# Patient Record
Sex: FEMALE | Race: WHITE | NOT HISPANIC OR LATINO | Employment: FULL TIME | URBAN - METROPOLITAN AREA
[De-identification: names, ages, dates, MRNs, and addresses within clinical notes are randomized per-mention and may not be internally consistent; named-entity substitution may affect disease eponyms.]

---

## 2023-04-07 ENCOUNTER — OFFICE VISIT (OUTPATIENT)
Dept: PRIMARY CARE | Facility: CLINIC | Age: 43
End: 2023-04-07
Payer: COMMERCIAL

## 2023-04-07 VITALS
WEIGHT: 191 LBS | DIASTOLIC BLOOD PRESSURE: 70 MMHG | SYSTOLIC BLOOD PRESSURE: 118 MMHG | BODY MASS INDEX: 28.95 KG/M2 | HEIGHT: 68 IN | HEART RATE: 83 BPM

## 2023-04-07 DIAGNOSIS — M25.572 ACUTE LEFT ANKLE PAIN: Primary | ICD-10-CM

## 2023-04-07 DIAGNOSIS — M25.472 LEFT ANKLE SWELLING: ICD-10-CM

## 2023-04-07 DIAGNOSIS — S93.402D INVERSION SPRAIN OF ANKLE, LEFT, SUBSEQUENT ENCOUNTER: ICD-10-CM

## 2023-04-07 DIAGNOSIS — B35.4 RINGWORM OF BODY: ICD-10-CM

## 2023-04-07 PROCEDURE — 99214 OFFICE O/P EST MOD 30 MIN: CPT | Performed by: STUDENT IN AN ORGANIZED HEALTH CARE EDUCATION/TRAINING PROGRAM

## 2023-04-07 RX ORDER — LORATADINE PSEUDOEPHEDRINE SULFATE 10; 240 MG/1; MG/1
TABLET, EXTENDED RELEASE ORAL EVERY 24 HOURS
COMMUNITY
End: 2024-01-02 | Stop reason: SDUPTHER

## 2023-04-07 RX ORDER — AMOXICILLIN AND CLAVULANATE POTASSIUM 875; 125 MG/1; MG/1
875 TABLET, FILM COATED ORAL
COMMUNITY
Start: 2023-04-05 | End: 2024-01-02 | Stop reason: ALTCHOICE

## 2023-04-07 RX ORDER — CLOTRIMAZOLE AND BETAMETHASONE DIPROPIONATE 10; .64 MG/G; MG/G
1 CREAM TOPICAL 2 TIMES DAILY
Qty: 15 G | Refills: 0 | Status: SHIPPED | OUTPATIENT
Start: 2023-04-07 | End: 2024-06-03 | Stop reason: ALTCHOICE

## 2023-04-07 RX ORDER — FLUTICASONE PROPIONATE 50 UG/1
SPRAY, METERED NASAL EVERY 24 HOURS
COMMUNITY
Start: 2016-10-31 | End: 2024-01-02 | Stop reason: SDUPTHER

## 2023-04-07 RX ORDER — ALBUTEROL SULFATE 90 UG/1
2 AEROSOL, METERED RESPIRATORY (INHALATION) EVERY 4 HOURS PRN
COMMUNITY
Start: 2016-04-19 | End: 2024-01-02 | Stop reason: SDUPTHER

## 2023-04-07 ASSESSMENT — PATIENT HEALTH QUESTIONNAIRE - PHQ9
2. FEELING DOWN, DEPRESSED OR HOPELESS: NOT AT ALL
1. LITTLE INTEREST OR PLEASURE IN DOING THINGS: NOT AT ALL
SUM OF ALL RESPONSES TO PHQ9 QUESTIONS 1 AND 2: 0

## 2023-04-07 NOTE — PROGRESS NOTES
Subjective   Patient ID: Claudia Gregory is a 42 y.o. female who presents for Leg Swelling and Possible Ring Worm.  Today she is accompanied by alone.     HPI  1. Acute left ankle pain/Left leg swelling  Patient reports that she rolled her ankle around 7 weeks ago  There has been swelling throughout this whole time  She stated that her leg has been hot and a tingling sensation is present  Ankle pain is still present, but is much better than before  She was wearing a walking boot for 3 weeks, but has since been in her normal footwear    2. Ringworm  Patient reports a 1 week history of a rash that is on her lower left leg  It is pruritic and flaking  She has been using a massage rolling tool at her physical therapy office and thinks that it may be associated with that  Another physician had called her in amoxacillin 875-125 mg, which was started this past Wednesday        Current Outpatient Medications on File Prior to Visit   Medication Sig Dispense Refill    albuterol 90 mcg/actuation inhaler Inhale 2 puffs every 4 hours if needed for shortness of breath.      amoxicillin-pot clavulanate (Augmentin) 875-125 mg tablet Take 1 tablet (875 mg) by mouth.      Flonase Allergy Relief 50 mcg/actuation nasal spray once every 24 hours.      loratadine-pseudoephedrine (Claritin-D 24 Hour)  mg 24 hr tablet once every 24 hours.       No current facility-administered medications on file prior to visit.        Allergies   Allergen Reactions    Adhesive Tape-Silicones Other    Keflex [Cephalexin] Other       Immunization History   Administered Date(s) Administered    Moderna SARS-CoV-2 Vaccination 02/17/2021, 03/17/2021, 12/13/2021         Review of Systems  All pertinent positive symptoms are included in the history of present illness.  All other systems have been reviewed and are negative and noncontributory to this patient's current ailments.     Objective   /70 (BP Location: Left arm, Patient Position: Sitting, BP Cuff  "Size: Adult)   Pulse 83   Ht 1.727 m (5' 8\")   Wt 86.6 kg (191 lb)   BMI 29.04 kg/m²   BSA: 2.04 meters squared  No visits with results within 1 Month(s) from this visit.   Latest known visit with results is:   [facility] Encounter on 12/20/2022   Component Date Value Ref Range Status    TSH 12/20/2022 0.63  0.44 - 3.98 mIU/L Final    Comment:  TSH testing is performed using different testing    methodology at [facility] than at other    [facility]. Direct result comparisons should    only be made within the same method.         Physical Exam  CONSTITUTIONAL - well nourished, well developed, looks like stated age, in no acute distress, not ill-appearing, and not tired appearing  SKIN - normal skin color and pigmentation, normal skin turgor without rash, lesions, or nodules visualized, 1 cm well demarcated red lesion that slightly crusting in the center on left lower posterior extremity  HEAD - no trauma, normocephalic  EYES - extraocular muscles are intact, and normal external exam  CHEST - clear to auscultation, no wheezing, no crackles and no rales, good effort  CARDIAC - regular rate and regular rhythm, no skipped beats, no murmur  EXTREMITIES - edema noted on the lateral aspect of the left ankle, no deformities noted, minor tenderness palpation to this lateral aspect, range of motion within normal limits  NEUROLOGICAL - antalgic gait, normal balance, normal motor, no ataxia,; alert, oriented and no focal signs  PSYCHIATRIC - alert, pleasant and cordial, age-appropriate  IMMUNOLOGIC - no cervical lymphadenopathy     Assessment/Plan   1. Acute left ankle pain/Left leg swelling  I have provided you a requisition for an MRI of your left ankle.  We will notify you of the test results once available and make treatment recommendations accordingly.    2. Ringworm  I have sent a prescription for Lotrisone cream for ringworm.   Apply the cream over the affected area once in the morning and at " night.  Please call back if the rash spreads or worsens.

## 2023-04-25 ENCOUNTER — TELEPHONE (OUTPATIENT)
Dept: PRIMARY CARE | Facility: CLINIC | Age: 43
End: 2023-04-25
Payer: COMMERCIAL

## 2023-04-25 DIAGNOSIS — R21 RASH: Primary | ICD-10-CM

## 2023-04-25 RX ORDER — TRIAMCINOLONE ACETONIDE 1 MG/G
OINTMENT TOPICAL 2 TIMES DAILY PRN
Qty: 15 G | Refills: 0 | Status: SHIPPED | OUTPATIENT
Start: 2023-04-25 | End: 2023-08-23

## 2023-04-25 NOTE — TELEPHONE ENCOUNTER
Pt called in stating that the cream we prescribed for ring worm is not helping, the area is exactly the same no improvement.

## 2023-06-15 ENCOUNTER — TELEPHONE (OUTPATIENT)
Dept: PRIMARY CARE | Facility: CLINIC | Age: 43
End: 2023-06-15
Payer: COMMERCIAL

## 2023-06-15 NOTE — TELEPHONE ENCOUNTER
Pt called to update us on what was originally thought to be ringworm actually is skin cancer. Dermatology attempted to freeze it off which then caused an infection. She was given doxycyline but still had redness, was then given 1 week of Bactrim which she now knows she is allergic too. They attempted to do a culture on the wound area but test came back invalid due to faulty supplies. Pt has follow up on Friday with dermatology hoping they will do another culture of the area. She states the wound itself looks to be pretty much healed but still has prominent redness surrounding the area. She wonders if she needs a referral for wound care clinic or to continue following up with dermatology?

## 2023-06-15 NOTE — TELEPHONE ENCOUNTER
----- Message from Lizy Bautista DO sent at 6/15/2023  1:10 PM EDT -----  This is very shocking to hear and I am so sorry that she is going through all of this    I would recommend that she follows up with dermatology at this point but at any time if dermatology is not fully helping her, we can easily place a referral to the wound care clinic that is located in [place]    Please let me know on what else I can do  ----- Message -----  From: Deepika Lin MA  Sent: 6/15/2023  10:13 AM EDT  To: Lizy Bautista DO

## 2024-01-02 ENCOUNTER — OFFICE VISIT (OUTPATIENT)
Dept: PRIMARY CARE | Facility: CLINIC | Age: 44
End: 2024-01-02
Payer: COMMERCIAL

## 2024-01-02 VITALS
BODY MASS INDEX: 26.98 KG/M2 | WEIGHT: 178 LBS | SYSTOLIC BLOOD PRESSURE: 120 MMHG | DIASTOLIC BLOOD PRESSURE: 80 MMHG | HEIGHT: 68 IN | HEART RATE: 97 BPM

## 2024-01-02 DIAGNOSIS — J30.2 SEASONAL ALLERGIES: ICD-10-CM

## 2024-01-02 DIAGNOSIS — F17.200 TOBACCO DEPENDENCE: ICD-10-CM

## 2024-01-02 DIAGNOSIS — R73.03 PREDIABETES: ICD-10-CM

## 2024-01-02 DIAGNOSIS — Z00.00 HEALTHCARE MAINTENANCE: Primary | ICD-10-CM

## 2024-01-02 DIAGNOSIS — E55.9 VITAMIN D DEFICIENCY: ICD-10-CM

## 2024-01-02 DIAGNOSIS — J31.0 CHRONIC RHINITIS: ICD-10-CM

## 2024-01-02 PROCEDURE — 93000 ELECTROCARDIOGRAM COMPLETE: CPT | Performed by: STUDENT IN AN ORGANIZED HEALTH CARE EDUCATION/TRAINING PROGRAM

## 2024-01-02 PROCEDURE — 99396 PREV VISIT EST AGE 40-64: CPT | Performed by: STUDENT IN AN ORGANIZED HEALTH CARE EDUCATION/TRAINING PROGRAM

## 2024-01-02 RX ORDER — ERGOCALCIFEROL 1.25 MG/1
50000 CAPSULE ORAL
Qty: 13 CAPSULE | Refills: 3 | Status: SHIPPED | OUTPATIENT
Start: 2024-01-02

## 2024-01-02 RX ORDER — LORATADINE 10 MG/1
10 TABLET ORAL DAILY
Qty: 30 TABLET | Refills: 5 | Status: SHIPPED | OUTPATIENT
Start: 2024-01-02 | End: 2024-06-30

## 2024-01-02 RX ORDER — FLUTICASONE PROPIONATE 50 MCG
1 SPRAY, SUSPENSION (ML) NASAL DAILY
Qty: 16 G | Refills: 3 | Status: SHIPPED | OUTPATIENT
Start: 2024-01-02

## 2024-01-02 RX ORDER — ALBUTEROL SULFATE 90 UG/1
2 AEROSOL, METERED RESPIRATORY (INHALATION) EVERY 4 HOURS PRN
Qty: 18 G | Refills: 1 | Status: SHIPPED | OUTPATIENT
Start: 2024-01-02

## 2024-01-02 RX ORDER — LORATADINE PSEUDOEPHEDRINE SULFATE 10; 240 MG/1; MG/1
1 TABLET, EXTENDED RELEASE ORAL EVERY 24 HOURS
Qty: 30 TABLET | Refills: 11 | Status: SHIPPED | OUTPATIENT
Start: 2024-01-02

## 2024-01-02 ASSESSMENT — PATIENT HEALTH QUESTIONNAIRE - PHQ9
2. FEELING DOWN, DEPRESSED OR HOPELESS: NOT AT ALL
SUM OF ALL RESPONSES TO PHQ9 QUESTIONS 1 AND 2: 0
1. LITTLE INTEREST OR PLEASURE IN DOING THINGS: NOT AT ALL

## 2024-01-02 NOTE — PROGRESS NOTES
Subjective   Patient ID: Claudia Gregory is a 43 y.o. female who presents for Annual Exam.  Today she is accompanied by alone.     HPI  1. Health Maintenance  Overall patient is doing well.   Immunization: Tdap 2020  Influenza vaccine declined  COVID-19 vaccine Moderna x 3  Colon Cancer Screening: No family history  OB/GYN: Sees Dr. Thomas; Pap smear and mammogram are up to date. Mammogram revealed a benign fibroadenoma and it is recommended she follow up for a repeat in 6 months.  Diet: Well balanced for the most part  Exercise: [employer] and gets 15,000 steps daily  Tobacco: 1 pack per day for 20 years  EtOH: Denies use    2. Chronic rhinitis / Seasonal allergies  Currently takes Claritin-D and Flonase daily  Uses albuterol inhaler as needed for seasonal changes  Requesting refills.    3. Tobacco dependence  Has smoked a pack per day for 20 years.  No interest in quitting at this time.    4. Prediabetes  Most recent A1C in October was 6%.    5.  Vitamin D deficiency  Has a history of vitamin D deficiency and now wondering if she can take once a day  Also willing to get her vitamin D checked      Current Outpatient Medications on File Prior to Visit   Medication Sig Dispense Refill    clotrimazole-betamethasone (Lotrisone) cream Apply 1 Application topically in the morning and 1 Application before bedtime. 15 g 0    [DISCONTINUED] albuterol 90 mcg/actuation inhaler Inhale 2 puffs every 4 hours if needed for shortness of breath.      [DISCONTINUED] amoxicillin-pot clavulanate (Augmentin) 875-125 mg tablet Take 1 tablet (875 mg) by mouth.      [DISCONTINUED] Flonase Allergy Relief 50 mcg/actuation nasal spray once every 24 hours.      [DISCONTINUED] loratadine-pseudoephedrine (Claritin-D 24 Hour)  mg 24 hr tablet once every 24 hours.       No current facility-administered medications on file prior to visit.        Allergies   Allergen Reactions    Adhesive Tape-Silicones Other    Bactrim  "[Sulfamethoxazole-Trimethoprim] Rash    Keflex [Cephalexin] Other       Immunization History   Administered Date(s) Administered    Moderna SARS-CoV-2 Vaccination 02/17/2021, 03/17/2021, 12/13/2021    Tdap vaccine, age 7 year and older (BOOSTRIX) 12/29/2020         Review of Systems  All pertinent positive symptoms are included in the history of present illness.  All other systems have been reviewed and are negative and noncontributory to this patient's current ailments.     Objective   /80 (BP Location: Left arm, Patient Position: Sitting, BP Cuff Size: Adult)   Pulse 97   Ht 1.727 m (5' 8\")   Wt 80.7 kg (178 lb)   BMI 27.06 kg/m²   BSA: 1.97 meters squared  No visits with results within 1 Month(s) from this visit.   Latest known visit with results is:   [facility] Encounter on 12/20/2022   Component Date Value Ref Range Status    TSH 12/20/2022 0.63  0.44 - 3.98 mIU/L Final    Comment:  TSH testing is performed using different testing    methodology at [facility] than at other    [facility]. Direct result comparisons should    only be made within the same method.         Physical Exam  CONSTITUTIONAL - well nourished, well developed, looks like stated age, in no acute distress, not ill-appearing, and not tired appearing  SKIN - normal skin color and pigmentation, normal skin turgor without rash, lesions, or nodules visualized  HEAD - no trauma, normocephalic  EYES - normal external exam  ENT - TM's intact, no injection, no signs of infection, uvula midline, normal tongue movement and throat normal, no exudate  NECK - supple without rigidity, no neck mass was observed, no thyromegaly or thyroid nodules  CHEST - clear to auscultation, no wheezing, no crackles and no rales, good effort  CARDIAC - regular rate and regular rhythm, no skipped beats, no murmur  ABDOMEN - no organomegaly, soft, nontender, nondistended, normal bowel sounds, no guarding/rebound/rigidity  EXTREMITIES - no edema, no " deformities  NEUROLOGICAL - normal gait, normal balance, normal motor, no ataxia, alert, oriented and no focal signs  PSYCHIATRIC - alert, pleasant and cordial, age-appropriate  IMMUNOLOGIC - no cervical lymphadenopathy     Assessment/Plan   1. Health Maintenance  Complete history and physical examination was performed  We provided you with a requisition for blood work today  We will notify of test results once available and make treatment recommendations accordingly  EKG shows normal sinus rhythm without any acute changes  Please follow with your OB/GYN for your repeat mammogram in the near future due to your slight abnormalities that were noted  Please obtain influenza vaccination at your earliest mutual convenience    2. Chronic rhinitis / Seasonal allergies  Continue taking the Claritin-D, Flonase, and albuterol inhaler as needed.  I also prescribed regular Claritin that I would recommend  Every other fashion with your Claritin-D  Refills have been sent to your pharmacy.    3. Tobacco dependence  You were counseled on smoking cessation but are not interested in quitting at this time.  If you decide you would like to try quitting please call the office and we can provide you with resources to help with that process.    4. Prediabetes  Your previous A1C placed you in the prediabetic category.  Please continue to eat a well balanced diet and exercise regularly.    5.  Vitamin D deficiency  I did prescribe vitamin D medication to take once weekly  I also provided a requisition for vitamin D to add onto your lab work    Please contact the office if you have any questions/concerns.

## 2024-01-17 ENCOUNTER — TELEMEDICINE (OUTPATIENT)
Dept: PRIMARY CARE | Facility: CLINIC | Age: 44
End: 2024-01-17
Payer: COMMERCIAL

## 2024-01-17 DIAGNOSIS — R09.81 SINUS CONGESTION: ICD-10-CM

## 2024-01-17 DIAGNOSIS — J01.00 ACUTE NON-RECURRENT MAXILLARY SINUSITIS: Primary | ICD-10-CM

## 2024-01-17 PROCEDURE — 99214 OFFICE O/P EST MOD 30 MIN: CPT | Performed by: STUDENT IN AN ORGANIZED HEALTH CARE EDUCATION/TRAINING PROGRAM

## 2024-01-17 RX ORDER — DOXYCYCLINE 100 MG/1
100 CAPSULE ORAL 2 TIMES DAILY
Qty: 20 CAPSULE | Refills: 0 | Status: SHIPPED | OUTPATIENT
Start: 2024-01-17 | End: 2024-06-03 | Stop reason: SDUPTHER

## 2024-01-17 NOTE — PROGRESS NOTES
Subjective   Patient ID: Claudia Gregory is a 43 y.o. female who presents for sinus infection.  Today she is accompanied by alone.     HPI  Sinusitis  Recently got over a cold and a flu  She hasn't felt right since then, her voice is gone  She went to the chiropractor and he said her sinuses were full  He put pressure on her sinuses and she has had a headache since then  She also reports congestion   Denies fever, chills  Has a history of having sinus issues and even saw an otolaryngologist back in 2019 provided her a 14-day prescription of doxycycline  Feels very similar to that case    Current Outpatient Medications on File Prior to Visit   Medication Sig Dispense Refill    albuterol 90 mcg/actuation inhaler Inhale 2 puffs every 4 hours if needed for shortness of breath. 18 g 1    clotrimazole-betamethasone (Lotrisone) cream Apply 1 Application topically in the morning and 1 Application before bedtime. 15 g 0    ergocalciferol (Vitamin D-2) 1.25 MG (24868 UT) capsule Take 1 capsule (50,000 Units) by mouth 1 (one) time per week. 13 capsule 3    fluticasone (Flonase Allergy Relief) 50 mcg/actuation nasal spray Administer 1 spray into each nostril once daily. 16 g 3    loratadine (Claritin) 10 mg tablet Take 1 tablet (10 mg) by mouth once daily. 30 tablet 5    loratadine-pseudoephedrine (Claritin-D 24 Hour)  mg 24 hr tablet Take 1 tablet by mouth once every 24 hours. 30 tablet 11     No current facility-administered medications on file prior to visit.        Allergies   Allergen Reactions    Adhesive Tape-Silicones Other    Bactrim [Sulfamethoxazole-Trimethoprim] Rash    Keflex [Cephalexin] Other       Immunization History   Administered Date(s) Administered    Moderna SARS-CoV-2 Vaccination 02/17/2021, 03/17/2021, 12/13/2021    Tdap vaccine, age 7 year and older (BOOSTRIX) 12/29/2020         Review of Systems  All pertinent positive symptoms are included in the history of present illness.  All other systems have  been reviewed and are negative and noncontributory to this patient's current ailments.     Objective   There were no vitals taken for this visit.  BSA: There is no height or weight on file to calculate BSA.  No visits with results within 1 Month(s) from this visit.   Latest known visit with results is:   [facility] Encounter on 12/20/2022   Component Date Value Ref Range Status    TSH 12/20/2022 0.63  0.44 - 3.98 mIU/L Final    Comment:  TSH testing is performed using different testing    methodology at [facility] than at other    [facility]. Direct result comparisons should    only be made within the same method.         Physical Exam  CONSTITUTIONAL - well nourished, well developed, looks like stated age, in no acute distress, not ill-appearing, and not tired appearing  SKIN - normal skin color and pigmentation, normal skin turgor without rash, lesions, or nodules visualized  HEAD - no trauma, normocephalic  EYES - normal external exam  NEUROLOGICAL - normal gait, normal balance  PSYCHIATRIC - alert, pleasant and cordial, age-appropriate    Assessment/Plan   Sinusitis  Due to all her signs/symptoms I truly believe that you may have a sinusitis  I have sent an order for Doxycycline 10 mg, please take for 10 days  Please call in a week if you do not feel better then we would have to increase to 14 days    Risks, benefits, and options of treatment(s) were discussed after reviewing all current medication(s) and drug allergy(ies)  I opted for the treatment that we discussed with instructions on the medication use for your underlying medical ailment(s)  I encouraged supportive care such as rest, fluids and Advil/Tylenol as warranted  Return to the clinic in 7-10 days or sooner if symptoms worsen or persist as we will then further evaluate    If you have any questions or concerns please contact the office

## 2024-01-18 PROBLEM — Z01.419 WELL WOMAN EXAM WITH ROUTINE GYNECOLOGICAL EXAM: Status: ACTIVE | Noted: 2024-01-18

## 2024-01-18 NOTE — ASSESSMENT & PLAN NOTE
Pap is next due in 2025. Mammogram is due in July 2024.  Regular exercise and attaining/maintaining a healthy weight is encouraged.   Adequate calcium intake with diet or supplements is encouraged.    We will notify of any abnormal results.

## 2024-01-18 NOTE — PROGRESS NOTES
Subjective   Patient ID: Claudia Gregory is a 43 y.o. female who presents for Annual Exam.  Pap and HPV were negative 9/14/2022. LEEP was performed in distant past with negative pap since. She has had a benign breast biopsy with Dr. Rodríguez with next mammogram due now. Menses are once every two months now and she is having hot flushes. Menstrual flow is normal lasting 4-5 days. She notes increased stress and fatigue along with weight gain. She admits to having stress with her health over the past year along with her father's illness. She has labs ordered by PCP and we discussed adding FSH and estradiol. We also discussed provera if no menses by cycle day 45.         Review of Systems   Constitutional:  Negative for activity change.   HENT:  Negative for congestion.    Respiratory:  Negative for apnea and cough.    Cardiovascular:  Negative for chest pain.   Gastrointestinal:  Negative for constipation and diarrhea.   Genitourinary:  Negative for hematuria and vaginal pain.   Musculoskeletal:  Negative for joint swelling.   Neurological:  Negative for dizziness.   Psychiatric/Behavioral:  Negative for agitation.        Past Medical History:   Diagnosis Date    Abnormal Pap smear of cervix 2011    Cervicalgia     Chronic neck pain    Dorsalgia, unspecified     Chronic back pain    HPV (human papilloma virus) infection 2011    Hypermobility syndrome     Benign hypermobility syndrome    Personal history of other diseases of the musculoskeletal system and connective tissue     History of fibromyalgia    Personal history of other diseases of the musculoskeletal system and connective tissue     History of scoliosis    Unspecified otitis externa, unspecified ear 11/18/2016    Otitis externa      Past Surgical History:   Procedure Laterality Date    ADENOIDECTOMY  03/14/2016    Adenoidectomy    BREAST BIOPSY  2023    OTHER SURGICAL HISTORY  07/21/2020    Cervical loop electrosurgical excision    OTHER SURGICAL HISTORY   12/07/2022    Nose surgery    TONSILLECTOMY  03/14/2016    Tonsillectomy      Allergies   Allergen Reactions    Adhesive Tape-Silicones Other    Bactrim [Sulfamethoxazole-Trimethoprim] Rash    Keflex [Cephalexin] Other      Current Outpatient Medications on File Prior to Visit   Medication Sig Dispense Refill    albuterol 90 mcg/actuation inhaler Inhale 2 puffs every 4 hours if needed for shortness of breath. 18 g 1    doxycycline (Vibramycin) 100 mg capsule Take 1 capsule (100 mg) by mouth 2 times a day. Take with at least 8 ounces (large glass) of water, do not lie down for 30 minutes after 20 capsule 0    ergocalciferol (Vitamin D-2) 1.25 MG (13235 UT) capsule Take 1 capsule (50,000 Units) by mouth 1 (one) time per week. 13 capsule 3    fluticasone (Flonase Allergy Relief) 50 mcg/actuation nasal spray Administer 1 spray into each nostril once daily. 16 g 3    loratadine-pseudoephedrine (Claritin-D 24 Hour)  mg 24 hr tablet Take 1 tablet by mouth once every 24 hours. 30 tablet 11    clotrimazole-betamethasone (Lotrisone) cream Apply 1 Application topically in the morning and 1 Application before bedtime. 15 g 0    loratadine (Claritin) 10 mg tablet Take 1 tablet (10 mg) by mouth once daily. 30 tablet 5     No current facility-administered medications on file prior to visit.        Objective   Physical Exam  Constitutional:       Appearance: Normal appearance.   Neck:      Thyroid: No thyromegaly.   Cardiovascular:      Rate and Rhythm: Normal rate and regular rhythm.      Heart sounds: Normal heart sounds.   Pulmonary:      Effort: Pulmonary effort is normal.      Breath sounds: Normal breath sounds.   Chest:      Chest wall: No mass.   Breasts:     Right: Normal. No inverted nipple, mass, nipple discharge or skin change.      Left: Normal. No inverted nipple, mass, nipple discharge or skin change.   Abdominal:      General: There is no distension.      Palpations: Abdomen is soft. There is no mass.       Tenderness: There is no abdominal tenderness.   Genitourinary:     General: Normal vulva.      Exam position: Lithotomy position.      Labia:         Right: No rash.         Left: No rash.       Vagina: Normal. No lesions.      Cervix: No friability or lesion.      Uterus: Normal. Not enlarged and not tender.       Adnexa: Right adnexa normal and left adnexa normal.        Right: No mass or tenderness.          Left: No mass or tenderness.     Musculoskeletal:         General: No deformity.      Cervical back: Neck supple.   Lymphadenopathy:      Cervical: No cervical adenopathy.   Skin:     General: Skin is warm and dry.      Findings: No rash.   Neurological:      General: No focal deficit present.      Mental Status: She is alert.   Psychiatric:         Mood and Affect: Mood normal.         Behavior: Behavior is cooperative.         Thought Content: Thought content normal.           Problem List Items Addressed This Visit       Well woman exam with routine gynecological exam - Primary    Overview     Pap and HPV were negative 9/14/2022. LEEP was performed in distant past with negative pap since.          Current Assessment & Plan     Pap is next due in 2025. Mammogram is due in July 2024.  Regular exercise and attaining/maintaining a healthy weight is encouraged.   Adequate calcium intake with diet or supplements is encouraged.    We will notify of any abnormal results.          DUB (dysfunctional uterine bleeding)    Overview     Menses are spaced to about every two months. Will use provera if no spontaneous menses by cycle day 45. FSH and estradiol are ordered.         Relevant Medications    medroxyPROGESTERone (Provera) 10 mg tablet    Other Relevant Orders    Follicle Stimulating Hormone    Estradiol    Breast cancer screening by mammogram    Relevant Orders    BI mammo bilateral screening tomosynthesis

## 2024-01-23 ENCOUNTER — OFFICE VISIT (OUTPATIENT)
Dept: OBSTETRICS AND GYNECOLOGY | Facility: CLINIC | Age: 44
End: 2024-01-23
Payer: COMMERCIAL

## 2024-01-23 VITALS
BODY MASS INDEX: 26.36 KG/M2 | SYSTOLIC BLOOD PRESSURE: 132 MMHG | WEIGHT: 178 LBS | HEIGHT: 69 IN | DIASTOLIC BLOOD PRESSURE: 100 MMHG

## 2024-01-23 DIAGNOSIS — Z01.419 WELL WOMAN EXAM WITH ROUTINE GYNECOLOGICAL EXAM: Primary | ICD-10-CM

## 2024-01-23 DIAGNOSIS — Z12.31 BREAST CANCER SCREENING BY MAMMOGRAM: ICD-10-CM

## 2024-01-23 DIAGNOSIS — N93.8 DUB (DYSFUNCTIONAL UTERINE BLEEDING): ICD-10-CM

## 2024-01-23 PROCEDURE — 99396 PREV VISIT EST AGE 40-64: CPT | Performed by: OBSTETRICS & GYNECOLOGY

## 2024-01-23 RX ORDER — MEDROXYPROGESTERONE ACETATE 10 MG/1
10 TABLET ORAL DAILY
Qty: 30 TABLET | Refills: 3 | Status: SHIPPED | OUTPATIENT
Start: 2024-01-23 | End: 2024-06-03 | Stop reason: WASHOUT

## 2024-01-23 ASSESSMENT — ENCOUNTER SYMPTOMS
AGITATION: 0
APNEA: 0
JOINT SWELLING: 0
DIZZINESS: 0
HEMATURIA: 0
DIARRHEA: 0
CONSTIPATION: 0
ACTIVITY CHANGE: 0
COUGH: 0

## 2024-01-31 ENCOUNTER — APPOINTMENT (OUTPATIENT)
Dept: RADIOLOGY | Facility: HOSPITAL | Age: 44
End: 2024-01-31
Payer: COMMERCIAL

## 2024-02-09 ENCOUNTER — LAB (OUTPATIENT)
Dept: LAB | Facility: LAB | Age: 44
End: 2024-02-09
Payer: COMMERCIAL

## 2024-02-09 DIAGNOSIS — E55.9 VITAMIN D DEFICIENCY: ICD-10-CM

## 2024-02-09 DIAGNOSIS — N93.8 DUB (DYSFUNCTIONAL UTERINE BLEEDING): ICD-10-CM

## 2024-02-09 DIAGNOSIS — Z00.00 HEALTHCARE MAINTENANCE: ICD-10-CM

## 2024-02-09 LAB
25(OH)D3 SERPL-MCNC: 24 NG/ML (ref 30–100)
ALBUMIN SERPL BCP-MCNC: 4.2 G/DL (ref 3.4–5)
ALP SERPL-CCNC: 77 U/L (ref 33–110)
ALT SERPL W P-5'-P-CCNC: 16 U/L (ref 7–45)
ANION GAP SERPL CALC-SCNC: 13 MMOL/L (ref 10–20)
AST SERPL W P-5'-P-CCNC: 15 U/L (ref 9–39)
BASOPHILS # BLD AUTO: 0.06 X10*3/UL (ref 0–0.1)
BASOPHILS NFR BLD AUTO: 0.6 %
BILIRUB SERPL-MCNC: 0.5 MG/DL (ref 0–1.2)
BUN SERPL-MCNC: 13 MG/DL (ref 6–23)
CALCIUM SERPL-MCNC: 8.6 MG/DL (ref 8.6–10.3)
CHLORIDE SERPL-SCNC: 105 MMOL/L (ref 98–107)
CHOLEST SERPL-MCNC: 208 MG/DL (ref 0–199)
CHOLESTEROL/HDL RATIO: 4
CO2 SERPL-SCNC: 24 MMOL/L (ref 21–32)
CREAT SERPL-MCNC: 0.45 MG/DL (ref 0.5–1.05)
EGFRCR SERPLBLD CKD-EPI 2021: >90 ML/MIN/1.73M*2
EOSINOPHIL # BLD AUTO: 0.14 X10*3/UL (ref 0–0.7)
EOSINOPHIL NFR BLD AUTO: 1.3 %
ERYTHROCYTE [DISTWIDTH] IN BLOOD BY AUTOMATED COUNT: 13 % (ref 11.5–14.5)
ESTRADIOL SERPL-MCNC: 91 PG/ML
FSH SERPL-ACNC: 3.2 IU/L
GLUCOSE SERPL-MCNC: 95 MG/DL (ref 74–99)
HCT VFR BLD AUTO: 46.7 % (ref 36–46)
HDLC SERPL-MCNC: 51.5 MG/DL
HGB BLD-MCNC: 15 G/DL (ref 12–16)
IMM GRANULOCYTES # BLD AUTO: 0.03 X10*3/UL (ref 0–0.7)
IMM GRANULOCYTES NFR BLD AUTO: 0.3 % (ref 0–0.9)
LDLC SERPL CALC-MCNC: 130 MG/DL
LYMPHOCYTES # BLD AUTO: 3.28 X10*3/UL (ref 1.2–4.8)
LYMPHOCYTES NFR BLD AUTO: 30.1 %
MCH RBC QN AUTO: 30.1 PG (ref 26–34)
MCHC RBC AUTO-ENTMCNC: 32.1 G/DL (ref 32–36)
MCV RBC AUTO: 94 FL (ref 80–100)
MONOCYTES # BLD AUTO: 0.67 X10*3/UL (ref 0.1–1)
MONOCYTES NFR BLD AUTO: 6.1 %
NEUTROPHILS # BLD AUTO: 6.72 X10*3/UL (ref 1.2–7.7)
NEUTROPHILS NFR BLD AUTO: 61.6 %
NON HDL CHOLESTEROL: 157 MG/DL (ref 0–149)
NRBC BLD-RTO: 0 /100 WBCS (ref 0–0)
PLATELET # BLD AUTO: 120 X10*3/UL (ref 150–450)
POTASSIUM SERPL-SCNC: 4 MMOL/L (ref 3.5–5.3)
PROT SERPL-MCNC: 7.2 G/DL (ref 6.4–8.2)
RBC # BLD AUTO: 4.99 X10*6/UL (ref 4–5.2)
SODIUM SERPL-SCNC: 138 MMOL/L (ref 136–145)
TRIGL SERPL-MCNC: 133 MG/DL (ref 0–149)
TSH SERPL-ACNC: 0.44 MIU/L (ref 0.44–3.98)
VLDL: 27 MG/DL (ref 0–40)
WBC # BLD AUTO: 10.9 X10*3/UL (ref 4.4–11.3)

## 2024-02-09 PROCEDURE — 80053 COMPREHEN METABOLIC PANEL: CPT

## 2024-02-09 PROCEDURE — 82670 ASSAY OF TOTAL ESTRADIOL: CPT

## 2024-02-09 PROCEDURE — 82306 VITAMIN D 25 HYDROXY: CPT

## 2024-02-09 PROCEDURE — 36415 COLL VENOUS BLD VENIPUNCTURE: CPT

## 2024-02-09 PROCEDURE — 83001 ASSAY OF GONADOTROPIN (FSH): CPT

## 2024-02-09 PROCEDURE — 83036 HEMOGLOBIN GLYCOSYLATED A1C: CPT

## 2024-02-09 PROCEDURE — 80061 LIPID PANEL: CPT

## 2024-02-09 PROCEDURE — 84443 ASSAY THYROID STIM HORMONE: CPT

## 2024-02-09 PROCEDURE — 85025 COMPLETE CBC W/AUTO DIFF WBC: CPT

## 2024-02-10 LAB
EST. AVERAGE GLUCOSE BLD GHB EST-MCNC: 131 MG/DL
HBA1C MFR BLD: 6.2 %

## 2024-02-11 NOTE — RESULT ENCOUNTER NOTE
Hemoglobin A1c increased slightly to 6.2%    Vitamin D still is low at 24    Cholesterol slightly elevated 208, HDL 51, , triglyceride 133    Sugar, liver, kidneys, electrolytes are all within normal limits and/or stable    Complete blood cell count shows no anemia    Thyroid within normal limits

## 2024-02-23 ENCOUNTER — HOSPITAL ENCOUNTER (OUTPATIENT)
Dept: RADIOLOGY | Facility: CLINIC | Age: 44
Discharge: HOME | End: 2024-02-23
Payer: COMMERCIAL

## 2024-02-23 VITALS — HEIGHT: 69 IN | BODY MASS INDEX: 26.35 KG/M2 | WEIGHT: 177.91 LBS

## 2024-02-23 DIAGNOSIS — Z12.31 BREAST CANCER SCREENING BY MAMMOGRAM: ICD-10-CM

## 2024-02-23 PROCEDURE — 77067 SCR MAMMO BI INCL CAD: CPT | Performed by: STUDENT IN AN ORGANIZED HEALTH CARE EDUCATION/TRAINING PROGRAM

## 2024-02-23 PROCEDURE — 77063 BREAST TOMOSYNTHESIS BI: CPT | Performed by: STUDENT IN AN ORGANIZED HEALTH CARE EDUCATION/TRAINING PROGRAM

## 2024-02-23 PROCEDURE — 77067 SCR MAMMO BI INCL CAD: CPT

## 2024-03-21 ENCOUNTER — TELEPHONE (OUTPATIENT)
Dept: PRIMARY CARE | Facility: CLINIC | Age: 44
End: 2024-03-21
Payer: COMMERCIAL

## 2024-03-21 NOTE — LETTER
March 21, 2024     Patient: Claudia Gregory   YOB: 1980   Date of Visit: 3/21/2024       To Whom It May Concern:    Claudia Gregory may return to work 4/1/2024.   If you have any questions or concerns, please don't hesitate to call.         Sincerely,         Jihan Gaspar MD        CC: No Recipients

## 2024-03-21 NOTE — LETTER
April 1, 2024     Patient: Claudia Gregory   YOB: 1980   Date of Visit: 3/21/2024       To Whom It May Concern:    It is my medical opinion that Claudia Gregory  should not return to work at this time. Her father is seriously ill and hospitalized again. I do not have a definite end date for how long she will be needed but will be at least 2 more weks.  .    If you have any questions or concerns, please don't hesitate to call.         Sincerely,        Jihan Gaspar MD    CC: No Recipients

## 2024-03-21 NOTE — TELEPHONE ENCOUNTER
AV    3/19/24  1:59 PM  Claudia Gregory (Emergency Contact) contacted Me   Me     CN    3/19/24  2:01 PM  Note     Patient's daughter called in and stated that her Father, the patient is using his leg and is feeling better. The patient is asking for a note to return to work on 04/01/2024. The patient's daughter currently was approved for FMLA until the end of May but since her Father is doing better she would like to return to work.              CN    3/19/24  2:01 PM  You routed this conversation to Jihan Gaspar MD  March 21, 2024  Jihan Gaspar MD   to Me       3/21/24  1:27 PM  Notes for his daughter cannot go in his chart. I can change it but message needs to be in her chart.  Me   to Jihan Gaspar MD   CN    3/21/24  1:52 PM  His daughter got approved for FMLA because of her Dad. I know his daughter is not you patient so I need to send the request on her chart?  This encounter is not signed. The conversa

## 2024-03-28 NOTE — TELEPHONE ENCOUNTER
Patients father, Esequiel Gregory, took a turn for the worse and went to the ED. They sent him downtown  with rock hard abdomen 2 days of vomiting more than 9 times.    She needs the letter for return to work modified to return to work on April 15th      Please fax revised letter to [phone] Attn: Brenna Espinoza

## 2024-04-01 NOTE — TELEPHONE ENCOUNTER
Claudia does not need a return to work date, she just needs a letter that states her father, Esequiel has taken a turn for the worse., he is back in the hospital and Claudia cannot return to work today.     Please send letter to Claudia's or Esequiel Funes    She needs the letter today.

## 2024-04-02 ENCOUNTER — HOSPITAL ENCOUNTER (OUTPATIENT)
Dept: RADIOLOGY | Facility: CLINIC | Age: 44
Discharge: HOME | End: 2024-04-02
Payer: COMMERCIAL

## 2024-04-02 ENCOUNTER — OFFICE VISIT (OUTPATIENT)
Dept: PRIMARY CARE | Facility: CLINIC | Age: 44
End: 2024-04-02
Payer: COMMERCIAL

## 2024-04-02 VITALS
BODY MASS INDEX: 26.27 KG/M2 | WEIGHT: 178 LBS | DIASTOLIC BLOOD PRESSURE: 80 MMHG | SYSTOLIC BLOOD PRESSURE: 128 MMHG | HEART RATE: 113 BPM

## 2024-04-02 DIAGNOSIS — G47.00 INSOMNIA, UNSPECIFIED TYPE: ICD-10-CM

## 2024-04-02 DIAGNOSIS — M25.551 RIGHT HIP PAIN: ICD-10-CM

## 2024-04-02 DIAGNOSIS — M25.551 RIGHT HIP PAIN: Primary | ICD-10-CM

## 2024-04-02 PROCEDURE — 73523 X-RAY EXAM HIPS BI 5/> VIEWS: CPT

## 2024-04-02 PROCEDURE — 73523 X-RAY EXAM HIPS BI 5/> VIEWS: CPT | Mod: BILATERAL PROCEDURE | Performed by: RADIOLOGY

## 2024-04-02 PROCEDURE — 99214 OFFICE O/P EST MOD 30 MIN: CPT | Performed by: STUDENT IN AN ORGANIZED HEALTH CARE EDUCATION/TRAINING PROGRAM

## 2024-04-02 RX ORDER — TRAZODONE HYDROCHLORIDE 100 MG/1
100 TABLET ORAL NIGHTLY PRN
Qty: 90 TABLET | Refills: 0 | Status: SHIPPED | OUTPATIENT
Start: 2024-04-02 | End: 2024-06-03 | Stop reason: WASHOUT

## 2024-04-02 RX ORDER — HYDROCODONE BITARTRATE AND IBUPROFEN 7.5; 2 MG/1; MG/1
TABLET, FILM COATED ORAL
COMMUNITY
Start: 2024-03-24

## 2024-04-02 NOTE — PROGRESS NOTES
"Subjective   Patient ID: Claudia Gregory is a 43 y.o. female who presents for restless legs/not sleeping.  Today she is accompanied by alone.     HPI  Hip pain/insomnia  Patient has a chronic past medical history of chronic pain and continues to follow-up with a pain management specialist  She continues to get cortisone injections within her right piriformis every 3 months but now unfortunately stated that this is only lasting approximately 7 weeks    Continues to take Vicoprofen up to twice daily but states that this is not helping the pain in her signs/symptoms    Notes that all of her pain is within her right hip and now feels like it is more \"deep \"  She has a history of having x-rays in the past that showed severe arthritis and now wondering if this is causing her signs/symptoms and even insomnia    Asking for a requisition to follow-up with an orthopedic provider    Lastly, with her insomnia, she is now wondering what else can be used to help her sleep  Did try an old prescription of Robaxin that did not help    Asking for advice/recommendations    Current Outpatient Medications on File Prior to Visit   Medication Sig Dispense Refill    HYDROcodone-ibuprofen (Vicoprofen) 7.5-200 mg tablet TAKE 1 TABLET BY MOUTH AS NEEDED TWICE DAILY OK FILL AFTER 3/24/24      albuterol 90 mcg/actuation inhaler Inhale 2 puffs every 4 hours if needed for shortness of breath. 18 g 1    clotrimazole-betamethasone (Lotrisone) cream Apply 1 Application topically in the morning and 1 Application before bedtime. 15 g 0    doxycycline (Vibramycin) 100 mg capsule Take 1 capsule (100 mg) by mouth 2 times a day. Take with at least 8 ounces (large glass) of water, do not lie down for 30 minutes after 20 capsule 0    ergocalciferol (Vitamin D-2) 1.25 MG (37060 UT) capsule Take 1 capsule (50,000 Units) by mouth 1 (one) time per week. 13 capsule 3    fluticasone (Flonase Allergy Relief) 50 mcg/actuation nasal spray Administer 1 spray into each " nostril once daily. 16 g 3    loratadine (Claritin) 10 mg tablet Take 1 tablet (10 mg) by mouth once daily. 30 tablet 5    loratadine-pseudoephedrine (Claritin-D 24 Hour)  mg 24 hr tablet Take 1 tablet by mouth once every 24 hours. 30 tablet 11    medroxyPROGESTERone (Provera) 10 mg tablet Take 1 tablet (10 mg) by mouth once daily. Take 1 tablet by mouth daily for 10 days. This can be started if no spontaneous menses by cycle day 45. 30 tablet 3     No current facility-administered medications on file prior to visit.        Allergies   Allergen Reactions    Adhesive Tape-Silicones Other    Bactrim [Sulfamethoxazole-Trimethoprim] Rash    Keflex [Cephalexin] Other       Immunization History   Administered Date(s) Administered    Moderna SARS-CoV-2 Vaccination 02/17/2021, 03/17/2021, 12/13/2021    Tdap vaccine, age 7 year and older (BOOSTRIX, ADACEL) 12/29/2020         Review of Systems  All pertinent positive symptoms are included in the history of present illness.  All other systems have been reviewed and are negative and noncontributory to this patient's current ailments.     Objective   /80 (BP Location: Left arm, Patient Position: Sitting, BP Cuff Size: Adult)   Pulse (!) 113   Wt 80.7 kg (178 lb)   BMI 26.27 kg/m²   BSA: 1.98 meters squared  No visits with results within 1 Month(s) from this visit.   Latest known visit with results is:   Lab on 02/09/2024   Component Date Value Ref Range Status    Thyroid Stimulating Hormone 02/09/2024 0.44  0.44 - 3.98 mIU/L Final    Cholesterol 02/09/2024 208 (H)  0 - 199 mg/dL Final          Age      Desirable   Borderline High   High     0-19 Y     0 - 169       170 - 199     >/= 200    20-24 Y     0 - 189       190 - 224     >/= 225         >24 Y     0 - 199       200 - 239     >/= 240   **All ranges are based on fasting samples. Specific   therapeutic targets will vary based on patient-specific   cardiac risk.    Pediatric guidelines reference:Pediatrics  2011, 128(S5).Adult guidelines reference: NCEP ATPIII Guidelines,TL 2001, 258:2486-97    Venipuncture immediately after or during the administration of Metamizole may lead to falsely low results. Testing should be performed immediately prior to Metamizole dosing.    HDL-Cholesterol 02/09/2024 51.5  mg/dL Final      Age       Very Low   Low     Normal    High    0-19 Y    < 35      < 40     40-45     ----  20-24 Y    ----     < 40      >45      ----        >24 Y      ----     < 40     40-60      >60      Cholesterol/HDL Ratio 02/09/2024 4.0   Final      Ref Values  Desirable  < 3.4  High Risk  > 5.0    LDL Calculated 02/09/2024 130 (H)  <=99 mg/dL Final                                Near   Borderline      AGE      Desirable  Optimal    High     High     Very High     0-19 Y     0 - 109     ---    110-129   >/= 130     ----    20-24 Y     0 - 119     ---    120-159   >/= 160     ----      >24 Y     0 -  99   100-129  130-159   160-189     >/=190      VLDL 02/09/2024 27  0 - 40 mg/dL Final    Triglycerides 02/09/2024 133  0 - 149 mg/dL Final       Age         Desirable   Borderline High   High     Very High   0 D-90 D    19 - 174         ----         ----        ----  91 D- 9 Y     0 -  74        75 -  99     >/= 100      ----    10-19 Y     0 -  89        90 - 129     >/= 130      ----    20-24 Y     0 - 114       115 - 149     >/= 150      ----         >24 Y     0 - 149       150 - 199    200- 499    >/= 500    Venipuncture immediately after or during the administration of Metamizole may lead to falsely low results. Testing should be performed immediately prior to Metamizole dosing.    Non HDL Cholesterol 02/09/2024 157 (H)  0 - 149 mg/dL Final          Age       Desirable   Borderline High   High     Very High     0-19 Y     0 - 119       120 - 144     >/= 145    >/= 160    20-24 Y     0 - 149       150 - 189     >/= 190      ----         >24 Y    30 mg/dL above LDL Cholesterol goal      Glucose 02/09/2024 95  74  - 99 mg/dL Final    Sodium 02/09/2024 138  136 - 145 mmol/L Final    Potassium 02/09/2024 4.0  3.5 - 5.3 mmol/L Final    Chloride 02/09/2024 105  98 - 107 mmol/L Final    Bicarbonate 02/09/2024 24  21 - 32 mmol/L Final    Anion Gap 02/09/2024 13  10 - 20 mmol/L Final    Urea Nitrogen 02/09/2024 13  6 - 23 mg/dL Final    Creatinine 02/09/2024 0.45 (L)  0.50 - 1.05 mg/dL Final    eGFR 02/09/2024 >90  >60 mL/min/1.73m*2 Final    Calculations of estimated GFR are performed using the 2021 CKD-EPI Study Refit equation without the race variable for the IDMS-Traceable creatinine methods.  https://jasn.asnjournals.org/content/early/2021/09/22/ASN.7958376109    Calcium 02/09/2024 8.6  8.6 - 10.3 mg/dL Final    Albumin 02/09/2024 4.2  3.4 - 5.0 g/dL Final    Alkaline Phosphatase 02/09/2024 77  33 - 110 U/L Final    Total Protein 02/09/2024 7.2  6.4 - 8.2 g/dL Final    AST 02/09/2024 15  9 - 39 U/L Final    Bilirubin, Total 02/09/2024 0.5  0.0 - 1.2 mg/dL Final    ALT 02/09/2024 16  7 - 45 U/L Final    Patients treated with Sulfasalazine may generate falsely decreased results for ALT.    WBC 02/09/2024 10.9  4.4 - 11.3 x10*3/uL Final    nRBC 02/09/2024 0.0  0.0 - 0.0 /100 WBCs Final    RBC 02/09/2024 4.99  4.00 - 5.20 x10*6/uL Final    Hemoglobin 02/09/2024 15.0  12.0 - 16.0 g/dL Final    Hematocrit 02/09/2024 46.7 (H)  36.0 - 46.0 % Final    MCV 02/09/2024 94  80 - 100 fL Final    MCH 02/09/2024 30.1  26.0 - 34.0 pg Final    MCHC 02/09/2024 32.1  32.0 - 36.0 g/dL Final    RDW 02/09/2024 13.0  11.5 - 14.5 % Final    Platelets 02/09/2024 120 (L)  150 - 450 x10*3/uL Final    Neutrophils % 02/09/2024 61.6  40.0 - 80.0 % Final    Immature Granulocytes %, Automated 02/09/2024 0.3  0.0 - 0.9 % Final    Immature Granulocyte Count (IG) includes promyelocytes, myelocytes and metamyelocytes but does not include bands. Percent differential counts (%) should be interpreted in the context of the absolute cell counts (cells/UL).     Lymphocytes % 02/09/2024 30.1  13.0 - 44.0 % Final    Monocytes % 02/09/2024 6.1  2.0 - 10.0 % Final    Eosinophils % 02/09/2024 1.3  0.0 - 6.0 % Final    Basophils % 02/09/2024 0.6  0.0 - 2.0 % Final    Neutrophils Absolute 02/09/2024 6.72  1.20 - 7.70 x10*3/uL Final    Percent differential counts (%) should be interpreted in the context of the absolute cell counts (cells/uL).    Immature Granulocytes Absolute, Au* 02/09/2024 0.03  0.00 - 0.70 x10*3/uL Final    Lymphocytes Absolute 02/09/2024 3.28  1.20 - 4.80 x10*3/uL Final    Monocytes Absolute 02/09/2024 0.67  0.10 - 1.00 x10*3/uL Final    Eosinophils Absolute 02/09/2024 0.14  0.00 - 0.70 x10*3/uL Final    Basophils Absolute 02/09/2024 0.06  0.00 - 0.10 x10*3/uL Final    Hemoglobin A1C 02/09/2024 6.2 (H)  see below % Final    Estimated Average Glucose 02/09/2024 131  Not Established mg/dL Final    Vitamin D, 25-Hydroxy, Total 02/09/2024 24 (L)  30 - 100 ng/mL Final    Follicle Stimulating Hormone 02/09/2024 3.2  IU/L Final    FSH Ref Values  Follicular   2.0-12.0  IU/L  Mid-Cycle        12.0-25.0  IU/L  Luteal Phase      2.0-12.0  IU/L  Menopause       30.0-150.0  IU/L  Pre-puberty     50% Adult IU/L  Adult Male        2.0-10.0  IU/L   Infants          0.0-1.0  IU/L    Estradiol 02/09/2024 91  pg/mL Final       Physical Exam  CONSTITUTIONAL - well nourished, well developed, looks like stated age, in no acute distress, not ill-appearing, and not tired appearing  SKIN - normal skin color and pigmentation, normal skin turgor without rash, lesions, or nodules visualized  HEAD - no trauma, normocephalic  EYES - normal external exam  CHEST -no distressed breathing, good effort  EXTREMITIES - no edema, no deformities  NEUROLOGICAL - normal balance, normal motor, no ataxia  PSYCHIATRIC - alert, pleasant and cordial, age-appropriate    Assessment/Plan   Right hip pain/insomnia  We had a long conversation about your signs/symptoms and I did provide a requisition for both  an x-ray as well as an orthopedic consult  Please have the x-ray done after today's visit and will notify the results and make recommendations accordingly    I provided information for two specialty providers, Dr. Germain and Dr. Hinds, who both specialize in hip replacements  Please make an appointment at your earliest mutual convenience    To help with your insomnia, I would recommend you contact your pain management specialty provider once again to see there is anything else they can do but I did provide trazodone to see if this can help you sleep further    At any point if you notice worsening or acute signs/symptoms please notify me immediately and/or go to the nearest emergency room to be acutely evaluated      Thank you for letting us be a part of your care team  If you have any questions or concerns, please contact the office

## 2024-04-04 NOTE — RESULT ENCOUNTER NOTE
X-ray of the hips too much my surprise are within normal limits    There is no major arthritis    The neck step is following up with pain management and the orthopedic provider to see if there is something else going on    Another thing, this could all be coming from her back    I would recommend following up with pain management and the orthopedic provider at her scheduled visits

## 2024-04-08 ENCOUNTER — APPOINTMENT (OUTPATIENT)
Dept: PRIMARY CARE | Facility: CLINIC | Age: 44
End: 2024-04-08
Payer: COMMERCIAL

## 2024-04-15 ENCOUNTER — HOSPITAL ENCOUNTER (OUTPATIENT)
Dept: RADIOLOGY | Facility: HOSPITAL | Age: 44
Discharge: HOME | End: 2024-04-15
Payer: COMMERCIAL

## 2024-04-15 ENCOUNTER — HOSPITAL ENCOUNTER (OUTPATIENT)
Dept: RADIOLOGY | Facility: CLINIC | Age: 44
Discharge: HOME | End: 2024-04-15
Payer: COMMERCIAL

## 2024-04-15 ENCOUNTER — OFFICE VISIT (OUTPATIENT)
Dept: ORTHOPEDIC SURGERY | Facility: CLINIC | Age: 44
End: 2024-04-15
Payer: COMMERCIAL

## 2024-04-15 VITALS — WEIGHT: 178 LBS | HEIGHT: 69 IN | BODY MASS INDEX: 26.36 KG/M2

## 2024-04-15 DIAGNOSIS — G47.00 INSOMNIA, UNSPECIFIED TYPE: ICD-10-CM

## 2024-04-15 DIAGNOSIS — M25.551 RIGHT HIP PAIN: ICD-10-CM

## 2024-04-15 PROCEDURE — 72170 X-RAY EXAM OF PELVIS: CPT | Performed by: RADIOLOGY

## 2024-04-15 PROCEDURE — 72170 X-RAY EXAM OF PELVIS: CPT

## 2024-04-15 PROCEDURE — 99204 OFFICE O/P NEW MOD 45 MIN: CPT | Performed by: ORTHOPAEDIC SURGERY

## 2024-04-15 ASSESSMENT — PAIN SCALES - GENERAL: PAINLEVEL_OUTOF10: 9

## 2024-04-15 ASSESSMENT — PAIN - FUNCTIONAL ASSESSMENT: PAIN_FUNCTIONAL_ASSESSMENT: 0-10

## 2024-04-15 NOTE — PROGRESS NOTES
PRIMARY CARE PHYSICIAN: Lizy Bautista DO  REFERRING PROVIDER: Lizy Bautista DO  [address]  [facility], [address]  [place] [address]     CONSULT ORTHOPAEDIC: Hip Evaluation        ASSESSMENT & PLAN    IMPRESSION:  1.  Normal right hip exam  2.  Right hip sacroiliitis  3.  Right hip piriformis syndrome    PLAN:  Discussed with the patient findings above.  Reviewed her current x-rays with her.  She has some minimal arthritic changes in her right hip.  I think she is at a long progressive history of this right hip that is failed to improve and I discussed with her that I do not think she has any sort of leg length discrepancy causing her current symptoms and the only way to address leg length discrepancy would be with full-length standing x-rays.  I do think the chiropractic treatments are helping her symptoms this may be a sign that this is a soft tissue related procedure and possibly she needs to continue her regular stretching program.  She had mild relief with pain management but she is again to revisit with her office for treatment recommendations that she has had some good relief with piriformis injections and additionally had a previous ablation that she may be repeating as well.  I did reassure her that there is no issues of the hip and would not require any surgical intervention from a hip standpoint in terms of hip replacement as is my specialty.  Patient is agreement this plan of care.  She will follow-up as needed with me.      SUBJECTIVE  CHIEF COMPLAINT:   Chief Complaint   Patient presents with    Right Hip - Pain        HPI: Claudia Gregory is a 43 y.o. patient. Claudia Gregory has had progressive problems with the right hip over the past 5 years. They do not report any trauma. They do report any constant or progressive numbness or tingling in their legs. Their symptoms are interfering with activities which include pain in her butt when she sits for long.  She does have some pain  on the lateral side of her hip at times and hurts when she walks or lifts her leg.  She states she feels a pinch in her butt.  She has known scoliosis.      FUNCTIONAL STATUS: not limited.  AMBULATORY STATUS:  independent  PREVIOUS TREATMENTS: NSAIDS ibuprofen or aleve as needed with mild improvement.  Has had previous ablation with pain management and has had injections for piriformis syndrome with varying relief.  Is had treatments with her chiropractor and feels like they work on lengthening her leg which seems to help her symptoms  HISTORY OF SURGERY ON AFFECTED HIP(S): No   BACK PAIN REPORTED: Yes       REVIEW OF SYSTEMS  Constitutional: See HPI for pain assessment, No significant weight loss, recent trauma  Cardiovascular: No chest pain, shortness of breath  Respiratory: No difficulty breathing, cough  Gastrointestinal: No nausea, vomiting, diarrhea, constipation  Musculoskeletal: Noted in HPI, positive for pain, restricted motion, stiffness  Integumentary: No rashes, easy bruising, redness   Neurological: no numbness or tingling in extremities, no gait disturbances   Psychiatric: No mood changes, memory changes, social issues  Heme/Lymph: no excessive swelling, easy bruising, excessive bleeding  ENT: No hearing changes  Eyes: No vision changes    Past Medical History:   Diagnosis Date    Abnormal Pap smear of cervix 2011    Cervicalgia     Chronic neck pain    Dorsalgia, unspecified     Chronic back pain    HPV (human papilloma virus) infection 2011    Hypermobility syndrome     Benign hypermobility syndrome    Personal history of other diseases of the musculoskeletal system and connective tissue     History of fibromyalgia    Personal history of other diseases of the musculoskeletal system and connective tissue     History of scoliosis    Unspecified otitis externa, unspecified ear 11/18/2016    Otitis externa        Allergies   Allergen Reactions    Adhesive Tape-Silicones Other    Bactrim  [Sulfamethoxazole-Trimethoprim] Rash    Keflex [Cephalexin] Other        Past Surgical History:   Procedure Laterality Date    ADENOIDECTOMY  03/14/2016    Adenoidectomy    BREAST BIOPSY Right 12/07/2022    RT benign fibroadenoma    OTHER SURGICAL HISTORY  07/21/2020    Cervical loop electrosurgical excision    OTHER SURGICAL HISTORY  12/07/2022    Nose surgery    TONSILLECTOMY  03/14/2016    Tonsillectomy        Family History   Problem Relation Name Age of Onset    Breast cancer Neg Hx          Social History     Socioeconomic History    Marital status: Single     Spouse name: Not on file    Number of children: Not on file    Years of education: Not on file    Highest education level: Not on file   Occupational History    Not on file   Tobacco Use    Smoking status: Every Day     Current packs/day: 1.00     Average packs/day: 1 pack/day for 15.0 years (15.0 ttl pk-yrs)     Types: Cigarettes    Smokeless tobacco: Never   Substance and Sexual Activity    Alcohol use: Never    Drug use: Never    Sexual activity: Not Currently     Partners: Male     Birth control/protection: Abstinence   Other Topics Concern    Not on file   Social History Narrative    Not on file     Social Determinants of Health     Financial Resource Strain: Not on file   Food Insecurity: Not on file   Transportation Needs: Not on file   Physical Activity: Not on file   Stress: Not on file   Social Connections: Not on file   Intimate Partner Violence: Not on file   Housing Stability: Not on file        CURRENT MEDICATIONS:   Current Outpatient Medications   Medication Sig Dispense Refill    albuterol 90 mcg/actuation inhaler Inhale 2 puffs every 4 hours if needed for shortness of breath. 18 g 1    clotrimazole-betamethasone (Lotrisone) cream Apply 1 Application topically in the morning and 1 Application before bedtime. 15 g 0    doxycycline (Vibramycin) 100 mg capsule Take 1 capsule (100 mg) by mouth 2 times a day. Take with at least 8 ounces  "(large glass) of water, do not lie down for 30 minutes after 20 capsule 0    ergocalciferol (Vitamin D-2) 1.25 MG (59397 UT) capsule Take 1 capsule (50,000 Units) by mouth 1 (one) time per week. 13 capsule 3    fluticasone (Flonase Allergy Relief) 50 mcg/actuation nasal spray Administer 1 spray into each nostril once daily. 16 g 3    HYDROcodone-ibuprofen (Vicoprofen) 7.5-200 mg tablet TAKE 1 TABLET BY MOUTH AS NEEDED TWICE DAILY OK FILL AFTER 3/24/24      loratadine (Claritin) 10 mg tablet Take 1 tablet (10 mg) by mouth once daily. 30 tablet 5    loratadine-pseudoephedrine (Claritin-D 24 Hour)  mg 24 hr tablet Take 1 tablet by mouth once every 24 hours. 30 tablet 11    medroxyPROGESTERone (Provera) 10 mg tablet Take 1 tablet (10 mg) by mouth once daily. Take 1 tablet by mouth daily for 10 days. This can be started if no spontaneous menses by cycle day 45. 30 tablet 3    traZODone (Desyrel) 100 mg tablet Take 1 tablet (100 mg) by mouth as needed at bedtime for sleep. 90 tablet 0     No current facility-administered medications for this visit.        OBJECTIVE    PHYSICAL EXAM      12/7/2022    10:16 AM 12/20/2022     1:38 PM 4/7/2023    10:29 AM 1/2/2024    10:01 AM 1/23/2024     8:50 AM 2/23/2024     3:21 PM 4/2/2024    10:42 AM   Vitals   Systolic 127 126 118 120 132  128   Diastolic 98 80 70 80 100  80   Heart Rate 96 94 83 97   113   Height (in) 1.702 m (5' 7\")  1.727 m (5' 8\") 1.727 m (5' 8\") 1.753 m (5' 9\") 1.753 m (5' 9.02\")    Weight (lb) 184 187 191 178 178 177.91 178   BMI 28.82 kg/m2 29.29 kg/m2 29.04 kg/m2 27.06 kg/m2 26.29 kg/m2 26.26 kg/m2 26.27 kg/m2   BSA (m2) 1.99 m2 2 m2 2.04 m2 1.97 m2 1.98 m2 1.98 m2 1.98 m2   Visit Report   Report Report Report  Report      There is no height or weight on file to calculate BMI.    GENERAL: A/Ox3, NAD. Appears healthy, well nourished  PSYCHIATRIC: Mood stable, appropriate memory recall  EYES: EOM intact, no scleral icterus  CARDIAC: regular rate  LUNGS: " Breathing non-labored  SKIN: no erythema, rashes, or ecchymoses     MUSCULOSKELETAL:  Laterality: right Hip Exam  - ROM, Extension: Full, no flexion contracture  - Strength: Abduction 5/5, Flexion 5/5  - Palpation: Nontender along hip and greater trochanter.  Has tenderness in the hip near sciatic notch and external rotators along with the right-sided sacroiliac joint  - Log roll/IR exam: Good motion, nonpainful  - Straight leg raise: Negative  - EHL/PF/DF motor intact  - Gait: Normal, no assistive device  - Special Tests: None performed    NEUROVASCULAR:  - Neurovascular Status: sensation intact to light touch distally  - Capillary refill brisk at extremities, Bilateral dorsalis pedis pulse 2+            IMAGING:  Multiple views of the affected right hip(s) demonstrate: well maintained joint space with minimal arthritic changes.   X-rays were personally reviewed and interpreted by me.  Radiology reports were reviewed by me as well, if readily available at the time.        Bert Germain DO  Attending Surgeon  Joint Replacement and Adult Reconstructive Surgery  [place], [place]

## 2024-04-15 NOTE — LETTER
April 15, 2024     Lizy Bautista DO  [address] [facility], [address] [address]    Patient: Claudia Gregory   YOB: 1980   Date of Visit: 4/15/2024       Dear Dr. Lizy Bautista DO:    Thank you for referring Claudia Gregory to me for evaluation. Below are my notes for this consultation.  If you have questions, please do not hesitate to call me. I look forward to following your patient along with you.       Sincerely,     Bert Germain DO      CC: No Recipients  ______________________________________________________________________________________    PRIMARY CARE PHYSICIAN: Lizy Bautista DO  REFERRING PROVIDER: DO Alejandro Yepez [facility], [address] [address]     CONSULT ORTHOPAEDIC: Hip Evaluation        ASSESSMENT & PLAN    IMPRESSION:  1.  Normal right hip exam  2.  Right hip sacroiliitis  3.  Right hip piriformis syndrome    PLAN:  Discussed with the patient findings above.  Reviewed her current x-rays with her.  She has some minimal arthritic changes in her right hip.  I think she is at a long progressive history of this right hip that is failed to improve and I discussed with her that I do not think she has any sort of leg length discrepancy causing her current symptoms and the only way to address leg length discrepancy would be with full-length standing x-rays.  I do think the chiropractic treatments are helping her symptoms this may be a sign that this is a soft tissue related procedure and possibly she needs to continue her regular stretching program.  She had mild relief with pain management but she is again to revisit with her office for treatment recommendations that she has had some good relief with piriformis injections and additionally had a previous ablation that she may be repeating as well.  I did reassure her that there is no issues of the hip and would not require any surgical intervention from a hip  standpoint in terms of hip replacement as is my specialty.  Patient is agreement this plan of care.  She will follow-up as needed with me.      SUBJECTIVE  CHIEF COMPLAINT:   Chief Complaint   Patient presents with   • Right Hip - Pain        HPI: Claudia Gregory is a 43 y.o. patient. Claudia Gregory has had progressive problems with the right hip over the past 5 years. They do not report any trauma. They do report any constant or progressive numbness or tingling in their legs. Their symptoms are interfering with activities which include pain in her butt when she sits for long.  She does have some pain on the lateral side of her hip at times and hurts when she walks or lifts her leg.  She states she feels a pinch in her butt.  She has known scoliosis.      FUNCTIONAL STATUS: not limited.  AMBULATORY STATUS:  independent  PREVIOUS TREATMENTS: NSAIDS ibuprofen or aleve as needed with mild improvement.  Has had previous ablation with pain management and has had injections for piriformis syndrome with varying relief.  Is had treatments with her chiropractor and feels like they work on lengthening her leg which seems to help her symptoms  HISTORY OF SURGERY ON AFFECTED HIP(S): No   BACK PAIN REPORTED: Yes       REVIEW OF SYSTEMS  Constitutional: See HPI for pain assessment, No significant weight loss, recent trauma  Cardiovascular: No chest pain, shortness of breath  Respiratory: No difficulty breathing, cough  Gastrointestinal: No nausea, vomiting, diarrhea, constipation  Musculoskeletal: Noted in HPI, positive for pain, restricted motion, stiffness  Integumentary: No rashes, easy bruising, redness   Neurological: no numbness or tingling in extremities, no gait disturbances   Psychiatric: No mood changes, memory changes, social issues  Heme/Lymph: no excessive swelling, easy bruising, excessive bleeding  ENT: No hearing changes  Eyes: No vision changes    Past Medical History:   Diagnosis Date   • Abnormal Pap smear of cervix  2011   • Cervicalgia     Chronic neck pain   • Dorsalgia, unspecified     Chronic back pain   • HPV (human papilloma virus) infection 2011   • Hypermobility syndrome     Benign hypermobility syndrome   • Personal history of other diseases of the musculoskeletal system and connective tissue     History of fibromyalgia   • Personal history of other diseases of the musculoskeletal system and connective tissue     History of scoliosis   • Unspecified otitis externa, unspecified ear 11/18/2016    Otitis externa        Allergies   Allergen Reactions   • Adhesive Tape-Silicones Other   • Bactrim [Sulfamethoxazole-Trimethoprim] Rash   • Keflex [Cephalexin] Other        Past Surgical History:   Procedure Laterality Date   • ADENOIDECTOMY  03/14/2016    Adenoidectomy   • BREAST BIOPSY Right 12/07/2022    RT benign fibroadenoma   • OTHER SURGICAL HISTORY  07/21/2020    Cervical loop electrosurgical excision   • OTHER SURGICAL HISTORY  12/07/2022    Nose surgery   • TONSILLECTOMY  03/14/2016    Tonsillectomy        Family History   Problem Relation Name Age of Onset   • Breast cancer Neg Hx          Social History     Socioeconomic History   • Marital status: Single     Spouse name: Not on file   • Number of children: Not on file   • Years of education: Not on file   • Highest education level: Not on file   Occupational History   • Not on file   Tobacco Use   • Smoking status: Every Day     Current packs/day: 1.00     Average packs/day: 1 pack/day for 15.0 years (15.0 ttl pk-yrs)     Types: Cigarettes   • Smokeless tobacco: Never   Substance and Sexual Activity   • Alcohol use: Never   • Drug use: Never   • Sexual activity: Not Currently     Partners: Male     Birth control/protection: Abstinence   Other Topics Concern   • Not on file   Social History Narrative   • Not on file     Social Determinants of Health     Financial Resource Strain: Not on file   Food Insecurity: Not on file   Transportation Needs: Not on file    Physical Activity: Not on file   Stress: Not on file   Social Connections: Not on file   Intimate Partner Violence: Not on file   Housing Stability: Not on file        CURRENT MEDICATIONS:   Current Outpatient Medications   Medication Sig Dispense Refill   • albuterol 90 mcg/actuation inhaler Inhale 2 puffs every 4 hours if needed for shortness of breath. 18 g 1   • clotrimazole-betamethasone (Lotrisone) cream Apply 1 Application topically in the morning and 1 Application before bedtime. 15 g 0   • doxycycline (Vibramycin) 100 mg capsule Take 1 capsule (100 mg) by mouth 2 times a day. Take with at least 8 ounces (large glass) of water, do not lie down for 30 minutes after 20 capsule 0   • ergocalciferol (Vitamin D-2) 1.25 MG (95784 UT) capsule Take 1 capsule (50,000 Units) by mouth 1 (one) time per week. 13 capsule 3   • fluticasone (Flonase Allergy Relief) 50 mcg/actuation nasal spray Administer 1 spray into each nostril once daily. 16 g 3   • HYDROcodone-ibuprofen (Vicoprofen) 7.5-200 mg tablet TAKE 1 TABLET BY MOUTH AS NEEDED TWICE DAILY OK FILL AFTER 3/24/24     • loratadine (Claritin) 10 mg tablet Take 1 tablet (10 mg) by mouth once daily. 30 tablet 5   • loratadine-pseudoephedrine (Claritin-D 24 Hour)  mg 24 hr tablet Take 1 tablet by mouth once every 24 hours. 30 tablet 11   • medroxyPROGESTERone (Provera) 10 mg tablet Take 1 tablet (10 mg) by mouth once daily. Take 1 tablet by mouth daily for 10 days. This can be started if no spontaneous menses by cycle day 45. 30 tablet 3   • traZODone (Desyrel) 100 mg tablet Take 1 tablet (100 mg) by mouth as needed at bedtime for sleep. 90 tablet 0     No current facility-administered medications for this visit.        OBJECTIVE    PHYSICAL EXAM      12/7/2022    10:16 AM 12/20/2022     1:38 PM 4/7/2023    10:29 AM 1/2/2024    10:01 AM 1/23/2024     8:50 AM 2/23/2024     3:21 PM 4/2/2024    10:42 AM   Vitals   Systolic 127 126 118 120 132  128   Diastolic 98 80  "70 80 100  80   Heart Rate 96 94 83 97   113   Height (in) 1.702 m (5' 7\")  1.727 m (5' 8\") 1.727 m (5' 8\") 1.753 m (5' 9\") 1.753 m (5' 9.02\")    Weight (lb) 184 187 191 178 178 177.91 178   BMI 28.82 kg/m2 29.29 kg/m2 29.04 kg/m2 27.06 kg/m2 26.29 kg/m2 26.26 kg/m2 26.27 kg/m2   BSA (m2) 1.99 m2 2 m2 2.04 m2 1.97 m2 1.98 m2 1.98 m2 1.98 m2   Visit Report   Report Report Report  Report      There is no height or weight on file to calculate BMI.    GENERAL: A/Ox3, NAD. Appears healthy, well nourished  PSYCHIATRIC: Mood stable, appropriate memory recall  EYES: EOM intact, no scleral icterus  CARDIAC: regular rate  LUNGS: Breathing non-labored  SKIN: no erythema, rashes, or ecchymoses     MUSCULOSKELETAL:  Laterality: right Hip Exam  - ROM, Extension: Full, no flexion contracture  - Strength: Abduction 5/5, Flexion 5/5  - Palpation: Nontender along hip and greater trochanter.  Has tenderness in the hip near sciatic notch and external rotators along with the right-sided sacroiliac joint  - Log roll/IR exam: Good motion, nonpainful  - Straight leg raise: Negative  - EHL/PF/DF motor intact  - Gait: Normal, no assistive device  - Special Tests: None performed    NEUROVASCULAR:  - Neurovascular Status: sensation intact to light touch distally  - Capillary refill brisk at extremities, Bilateral dorsalis pedis pulse 2+            IMAGING:  Multiple views of the affected right hip(s) demonstrate: well maintained joint space with minimal arthritic changes.   X-rays were personally reviewed and interpreted by me.  Radiology reports were reviewed by me as well, if readily available at the time.        Bert Germain DO  Attending Surgeon  Joint Replacement and Adult Reconstructive Surgery  [place], [place]                         "

## 2024-04-17 ENCOUNTER — TELEPHONE (OUTPATIENT)
Dept: PRIMARY CARE | Facility: CLINIC | Age: 44
End: 2024-04-17
Payer: COMMERCIAL

## 2024-04-17 DIAGNOSIS — M25.551 RIGHT HIP PAIN: ICD-10-CM

## 2024-04-17 NOTE — TELEPHONE ENCOUNTER
You see this patient's father, Esequiel Gregory.     Claudia called in and is requesting a return to work letter from you saying she is able to go back on 4/22/24, with no restrictions. She is unable to message you on her own [employer] as you are not her PCP. She is wanting this sent to her via [employer] once it is completed.

## 2024-04-17 NOTE — LETTER
Date: 2024  RE:  Claudia Gregory  :  1980      To Whom It May Concern:    Our patient, Claudia, can return to work on 2024 without restriction        If you have questions concerning this patient's immediate care, please feel free to contact our office at [phone].    Sincerely,      Jihan Gaspar MD

## 2024-04-26 DIAGNOSIS — E55.9 VITAMIN D DEFICIENCY: ICD-10-CM

## 2024-04-26 DIAGNOSIS — E03.9 HYPOTHYROIDISM, UNSPECIFIED TYPE: ICD-10-CM

## 2024-06-03 ENCOUNTER — APPOINTMENT (OUTPATIENT)
Dept: PRIMARY CARE | Facility: CLINIC | Age: 44
End: 2024-06-03
Payer: COMMERCIAL

## 2024-06-03 ENCOUNTER — TELEMEDICINE (OUTPATIENT)
Dept: PRIMARY CARE | Facility: CLINIC | Age: 44
End: 2024-06-03
Payer: COMMERCIAL

## 2024-06-03 DIAGNOSIS — J30.2 SEASONAL ALLERGIES: ICD-10-CM

## 2024-06-03 DIAGNOSIS — R09.81 SINUS CONGESTION: ICD-10-CM

## 2024-06-03 DIAGNOSIS — J31.0 CHRONIC RHINITIS: ICD-10-CM

## 2024-06-03 DIAGNOSIS — J01.00 ACUTE NON-RECURRENT MAXILLARY SINUSITIS: ICD-10-CM

## 2024-06-03 PROCEDURE — 99214 OFFICE O/P EST MOD 30 MIN: CPT | Performed by: STUDENT IN AN ORGANIZED HEALTH CARE EDUCATION/TRAINING PROGRAM

## 2024-06-03 RX ORDER — DOXYCYCLINE 100 MG/1
100 CAPSULE ORAL 2 TIMES DAILY
Qty: 20 CAPSULE | Refills: 0 | Status: SHIPPED | OUTPATIENT
Start: 2024-06-03

## 2024-06-03 RX ORDER — AZELASTINE 1 MG/ML
1 SPRAY, METERED NASAL 2 TIMES DAILY
Qty: 30 ML | Refills: 2 | Status: SHIPPED | OUTPATIENT
Start: 2024-06-03

## 2024-06-03 NOTE — PROGRESS NOTES
Subjective   Patient ID: Claudia Gregory is a 43 y.o. female who presents for URI.    HPI   Patient is calling into the office today via a telemedicine virtual visit to discuss signs/symptoms of worsening allergic rhinitis    Ultimately has a history of having sinus surgery at the age of 22 years old and ever since has only had 1 or 2 sinus infections yearly    Her most recent infection was earlier this year which it appears that we provided doxycycline    Continues to use fluticasone/Flonase daily as indicated in the chart    Also continues to take Claritin-D as this is the only thing that helps her signs/symptoms    Ever since May 24 she has been having signs/symptoms of right-sided sinus pressure as well as some drainage    Asking to be further evaluate/treat at today's visit    Review of Systems  All pertinent positive symptoms are included in the history of present illness.    All other systems have been reviewed and are negative and noncontributory to this patient's current ailments.    Objective   There were no vitals taken for this visit.    Physical Exam  CONSTITUTIONAL - well nourished, well developed, looks like stated age, in no acute distress, not ill-appearing, and not tired appearing  SKIN - normal skin color and pigmentation, normal skin turgor without rash, lesions, or nodules visualized  HEAD - no trauma, normocephalic  EYES - normal external exam  CHEST -no distressed breathing, good effort  EXTREMITIES - no edema, no deformities  NEUROLOGICAL - normal balance, normal motor, no ataxia  PSYCHIATRIC - alert, pleasant and cordial, age-appropriate    Assessment/Plan   We had a long conversation about all of your signs/symptoms and I truly believe that this could be a worsening of your allergic rhinitis or even the possibility of the start of a sinus infection due to this lasting now almost 2 weeks    Due to this, I did add on azelastine to take alongside your fluticasone  Please use both of these twice  daily as discussed at today's visit    I also provided doxycycline to start within the next 5-7 days if continued signs/symptoms occur    Risks, benefits, and options of treatment(s) were discussed after reviewing all current medication(s) and drug allergy(ies)  I opted for the treatment that we discussed with instructions on the medication use for your underlying medical ailment(s)  I encouraged supportive care such as rest, fluids and Advil/Tylenol as warranted  Return to the clinic in 7-10 days or sooner if symptoms worsen or persist as we will then further evaluate    If this continues to worsen, please contact the office within the next few weeks and we will discuss a CT scan of your sinuses  An appointment is not necessary at that time

## 2024-06-13 ENCOUNTER — TRANSCRIBE ORDERS (OUTPATIENT)
Dept: ORTHOPEDIC SURGERY | Facility: HOSPITAL | Age: 44
End: 2024-06-13
Payer: COMMERCIAL

## 2024-06-13 DIAGNOSIS — M54.50 LOW BACK PAIN, UNSPECIFIED BACK PAIN LATERALITY, UNSPECIFIED CHRONICITY, UNSPECIFIED WHETHER SCIATICA PRESENT: ICD-10-CM

## 2024-06-17 ENCOUNTER — HOSPITAL ENCOUNTER (OUTPATIENT)
Dept: RADIOLOGY | Facility: CLINIC | Age: 44
Discharge: HOME | End: 2024-06-17
Payer: COMMERCIAL

## 2024-06-17 ENCOUNTER — APPOINTMENT (OUTPATIENT)
Dept: ORTHOPEDIC SURGERY | Facility: CLINIC | Age: 44
End: 2024-06-17
Payer: COMMERCIAL

## 2024-06-17 ENCOUNTER — LAB (OUTPATIENT)
Dept: LAB | Facility: LAB | Age: 44
End: 2024-06-17
Payer: COMMERCIAL

## 2024-06-17 VITALS — BODY MASS INDEX: 26.36 KG/M2 | HEIGHT: 69 IN | WEIGHT: 178 LBS

## 2024-06-17 DIAGNOSIS — M54.50 LOW BACK PAIN, UNSPECIFIED BACK PAIN LATERALITY, UNSPECIFIED CHRONICITY, UNSPECIFIED WHETHER SCIATICA PRESENT: ICD-10-CM

## 2024-06-17 DIAGNOSIS — E55.9 VITAMIN D DEFICIENCY: ICD-10-CM

## 2024-06-17 DIAGNOSIS — E03.9 HYPOTHYROIDISM, UNSPECIFIED TYPE: ICD-10-CM

## 2024-06-17 DIAGNOSIS — M47.816 ARTHRITIS OF LUMBAR SPINE: Primary | ICD-10-CM

## 2024-06-17 LAB
25(OH)D3 SERPL-MCNC: 34 NG/ML (ref 30–100)
TSH SERPL-ACNC: 0.58 MIU/L (ref 0.44–3.98)

## 2024-06-17 PROCEDURE — 99214 OFFICE O/P EST MOD 30 MIN: CPT | Performed by: ORTHOPAEDIC SURGERY

## 2024-06-17 PROCEDURE — 72110 X-RAY EXAM L-2 SPINE 4/>VWS: CPT

## 2024-06-17 PROCEDURE — 36415 COLL VENOUS BLD VENIPUNCTURE: CPT

## 2024-06-17 PROCEDURE — 72110 X-RAY EXAM L-2 SPINE 4/>VWS: CPT | Performed by: RADIOLOGY

## 2024-06-17 PROCEDURE — 82306 VITAMIN D 25 HYDROXY: CPT

## 2024-06-17 PROCEDURE — 84443 ASSAY THYROID STIM HORMONE: CPT

## 2024-06-17 ASSESSMENT — PAIN - FUNCTIONAL ASSESSMENT: PAIN_FUNCTIONAL_ASSESSMENT: 0-10

## 2024-06-17 NOTE — PROGRESS NOTES
HPI:Claudia Gregory is a 43-year-old woman who comes in with complaints of some intermittent pinching in her right buttock.  She denies pain down the leg.  She has been doing radiofrequency ablations which gave her short-term relief.  Her symptoms are overall tolerable.      ROS:  Reviewed on EMR and patient intake sheet.    PMH/SH:  Reviewed on EMR and patient intake sheet.    Exam:  Physical Exam    Constitutional: Well appearing; no acute distress  Eyes: pupils are equal and round  Psych: normal affect  Respiratory: non-labored breathing  Cardiovascular: regular rate and rhythm  GI: non-distended abdomen  Musculoskeletal: no pain with range of motion of the hips bilaterally  Neurologic: [5]/5 strength in the lower extremities bilaterally]; [negative] straight leg raise    Radiology:     MRI of the lumbar spine from 2022 is normal.  Moderate facet arthropathy L4-L5    Diagnosis:    Chronic axial low back pain; lumbar spondylosis    Assessment and Plan:   43-year-old woman with lumbar spondylosis.  I recommend continued nonoperative management.  No surgical intervention required.  Will see her back as needed.    The patient was in agreement with the plan. At the end of the visit today, the patient felt that all questions had been answered satisfactorily.  The patient was pleased with the visit and very appreciative for the care rendered.     Thank you very much for the kind referral.  It is a privilege, and a pleasure, to partner with you in the care of your patients.  I would be delighted to assist you with any further consultations as needed.          Hesham Esquivel MD    Chief of Spine Surgery, [facility]  Director of Spine Service, [facility]  , Department of Orthopaedics  [facility] [employer]  [address] [place] [address]  P: [phone]  [facility]    This note was dictated with  voice recognition software.  It has not been proofread for grammatical errors, typographical mistakes or other semantic inconsistencies.

## 2024-06-18 NOTE — RESULT ENCOUNTER NOTE
Vitamin D now well within normal limits at 34    Thyroid-stimulating hormone also well within normal limits at 0.53    Keep up the good work in this regard!

## 2024-08-02 ENCOUNTER — APPOINTMENT (OUTPATIENT)
Dept: PRIMARY CARE | Facility: CLINIC | Age: 44
End: 2024-08-02
Payer: COMMERCIAL

## 2024-12-23 DIAGNOSIS — E55.9 VITAMIN D DEFICIENCY: ICD-10-CM

## 2024-12-23 RX ORDER — ERGOCALCIFEROL 1.25 MG/1
50000 CAPSULE ORAL
Qty: 4 CAPSULE | Refills: 0 | Status: SHIPPED | OUTPATIENT
Start: 2024-12-23

## 2024-12-30 ENCOUNTER — APPOINTMENT (OUTPATIENT)
Dept: PRIMARY CARE | Facility: CLINIC | Age: 44
End: 2024-12-30
Payer: COMMERCIAL

## 2025-01-02 PROBLEM — Z00.00 HEALTHCARE MAINTENANCE: Status: ACTIVE | Noted: 2024-01-23

## 2025-01-02 NOTE — PROGRESS NOTES
Subjective   Patient ID: Claudia Gregory is a 44 y.o. female who presents for Annual Physical.  Today she is accompanied by alone.     HPI  1. Health Maintenance  Overall patient is doing well.   Immunization: Tdap 2020  Influenza vaccine declined  COVID-19 vaccine Moderna x 3  Colon Cancer Screening: Grandmother had colon cancer at 80 years old  OB/GYN: Sees Dr. Thomas; Pap smear due in 2025, seeing OB/Gyn later this month. Mammogram due in February of 2025. Mammogram in February of 2024 showed there are areas of scattered fibroglandular tissue.   Diet: Well balanced for the most part  Exercise: [employer] and gets 15,000 steps daily  Tobacco: 1 pack per day for 20 years  EtOH: Denies use  Not fasting for blood work today    2. Chronic rhinitis / Seasonal allergies  Currently takes Claritin-D and Flonase daily  Uses albuterol inhaler as needed for seasonal changes  Requesting refills.    3. Tobacco dependence  Has smoked a pack per day for 20 years.  At least 1 ppd  No interest in quitting at this time.    4. Prediabetes  Most recent A1C in 02/2024 was 6.2%.    5.  Vitamin D deficiency  Taking 50,000 Units of vitamin D, which she reports has much improved her mood.  Most recent level was 34  Requesting to recheck lab    6. Erythematous right breast  States that she feels as if she had a clogged milk duct that she attempted to pop.  Became bruised and then erythematous, using triamcinolone cream.  Following up with OB/Gyn on 01/21/2025.      Current Outpatient Medications on File Prior to Visit   Medication Sig Dispense Refill    albuterol 90 mcg/actuation inhaler Inhale 2 puffs every 4 hours if needed for shortness of breath. 18 g 1    azelastine (Astelin) 137 mcg (0.1 %) nasal spray Administer 1 spray into each nostril 2 times a day. 30 mL 2    doxycycline (Vibramycin) 100 mg capsule Take 1 capsule (100 mg) by mouth 2 times a day. Take with at least 8 ounces (large glass) of water, do not lie down for 30 minutes  "after 20 capsule 0    HYDROcodone-ibuprofen (Vicoprofen) 7.5-200 mg tablet TAKE 1 TABLET BY MOUTH AS NEEDED TWICE DAILY OK FILL AFTER 3/24/24      [DISCONTINUED] ergocalciferol (Vitamin D-2) 1.25 MG (13949 UT) capsule Take 1 capsule (50,000 Units) by mouth 1 (one) time per week. 4 capsule 0    [DISCONTINUED] fluticasone (Flonase Allergy Relief) 50 mcg/actuation nasal spray Administer 1 spray into each nostril once daily. 16 g 3    [DISCONTINUED] loratadine (Claritin) 10 mg tablet Take 1 tablet (10 mg) by mouth once daily. 30 tablet 5    [DISCONTINUED] loratadine-pseudoephedrine (Claritin-D 24 Hour)  mg 24 hr tablet Take 1 tablet by mouth once every 24 hours. 30 tablet 11     No current facility-administered medications on file prior to visit.        Allergies   Allergen Reactions    Adhesive Tape-Silicones Other    Bactrim [Sulfamethoxazole-Trimethoprim] Rash    Keflex [Cephalexin] Other       Immunization History   Administered Date(s) Administered    Moderna SARS-CoV-2 Vaccination 02/17/2021, 03/17/2021, 12/13/2021    Tdap vaccine, age 7 year and older (BOOSTRIX, ADACEL) 12/29/2020         Review of Systems  All pertinent positive symptoms are included in the history of present illness.  All other systems have been reviewed and are negative and noncontributory to this patient's current ailments.     Objective   /76   Pulse 66   Ht 1.753 m (5' 9\")   Wt 79.8 kg (176 lb)   BMI 25.99 kg/m²   BSA: 1.97 meters squared  No visits with results within 1 Month(s) from this visit.   Latest known visit with results is:   Lab on 06/17/2024   Component Date Value Ref Range Status    Vitamin D, 25-Hydroxy, Total 06/17/2024 34  30 - 100 ng/mL Final    Thyroid Stimulating Hormone 06/17/2024 0.58  0.44 - 3.98 mIU/L Final       Physical Exam  CONSTITUTIONAL - well nourished, well developed, looks like stated age, in no acute distress, not ill-appearing, and not tired appearing  SKIN - normal skin color and " pigmentation, normal skin turgor without rash, lesions, or nodules visualized  HEAD - no trauma, normocephalic  EYES - normal external exam  ENT - TM's intact, no injection, no signs of infection, uvula midline, normal tongue movement and throat normal, no exudate  NECK - supple without rigidity, no neck mass was observed, no thyromegaly or thyroid nodules  CHEST - clear to auscultation, no wheezing, no crackles and no rales, good effort  CARDIAC - regular rate and regular rhythm, no skipped beats, no murmur  ABDOMEN - no organomegaly, soft, nontender, nondistended, normal bowel sounds, no guarding/rebound/rigidity  EXTREMITIES - no edema, no deformities  NEUROLOGICAL - normal gait, normal balance, normal motor, no ataxia, alert, oriented and no focal signs  PSYCHIATRIC - alert, pleasant and cordial, age-appropriate      Assessment/Plan   1. Health Maintenance  Complete history and physical examination was performed  We provided you with a requisition for blood work today, obtain while fasting  We will notify of test results once available and make treatment recommendations accordingly  Please follow with your OB/GYN for your repeat mammogram and pap smear in the near future   Please obtain influenza vaccination at your earliest mutual convenience    2. Chronic rhinitis / Seasonal allergies  Continue taking the Claritin-D, Flonase, and albuterol inhaler as needed.  I also prescribed regular Claritin that I would recommend  Every other fashion with your Claritin-D  Refills have been sent to your pharmacy.    3. Tobacco dependence  You were counseled on smoking cessation but are not interested in quitting at this time.  If you decide you would like to try quitting please call the office and we can provide you with resources to help with that process.    4. Prediabetes  Your previous A1C placed you in the prediabetic category.  Please continue to eat a well balanced diet and exercise regularly.  Repeat A1C ordered,  obtain at your earliest convenience.    5.  Vitamin D deficiency  Refill sent  I also provided a requisition for vitamin D to add onto your lab work    6. Erythematous right breast  Continue supportive care measures and OTC analgesics until your OB/Gyn appointment.  You told us they'll order a repeat mammogram for you.    Please contact the office if you have any questions/concerns. Follow up in 1 year

## 2025-01-03 ENCOUNTER — APPOINTMENT (OUTPATIENT)
Dept: PRIMARY CARE | Facility: CLINIC | Age: 45
End: 2025-01-03
Payer: COMMERCIAL

## 2025-01-03 VITALS
WEIGHT: 176 LBS | SYSTOLIC BLOOD PRESSURE: 124 MMHG | DIASTOLIC BLOOD PRESSURE: 76 MMHG | HEART RATE: 66 BPM | HEIGHT: 69 IN | BODY MASS INDEX: 26.07 KG/M2

## 2025-01-03 DIAGNOSIS — J31.0 CHRONIC RHINITIS: ICD-10-CM

## 2025-01-03 DIAGNOSIS — J01.00 ACUTE NON-RECURRENT MAXILLARY SINUSITIS: ICD-10-CM

## 2025-01-03 DIAGNOSIS — F17.200 TOBACCO DEPENDENCE: ICD-10-CM

## 2025-01-03 DIAGNOSIS — R09.81 SINUS CONGESTION: ICD-10-CM

## 2025-01-03 DIAGNOSIS — Z00.00 HEALTHCARE MAINTENANCE: Primary | ICD-10-CM

## 2025-01-03 DIAGNOSIS — N61.1 FURUNCLE OF BREAST: Primary | ICD-10-CM

## 2025-01-03 DIAGNOSIS — E55.9 VITAMIN D DEFICIENCY: ICD-10-CM

## 2025-01-03 DIAGNOSIS — J30.2 SEASONAL ALLERGIES: ICD-10-CM

## 2025-01-03 PROCEDURE — 3008F BODY MASS INDEX DOCD: CPT | Performed by: STUDENT IN AN ORGANIZED HEALTH CARE EDUCATION/TRAINING PROGRAM

## 2025-01-03 PROCEDURE — 99396 PREV VISIT EST AGE 40-64: CPT | Performed by: STUDENT IN AN ORGANIZED HEALTH CARE EDUCATION/TRAINING PROGRAM

## 2025-01-03 PROCEDURE — 99213 OFFICE O/P EST LOW 20 MIN: CPT | Performed by: STUDENT IN AN ORGANIZED HEALTH CARE EDUCATION/TRAINING PROGRAM

## 2025-01-03 RX ORDER — FLUTICASONE PROPIONATE 50 MCG
1 SPRAY, SUSPENSION (ML) NASAL DAILY
Qty: 16 G | Refills: 3 | Status: SHIPPED | OUTPATIENT
Start: 2025-01-03

## 2025-01-03 RX ORDER — ERGOCALCIFEROL 1.25 MG/1
50000 CAPSULE ORAL
Qty: 13 CAPSULE | Refills: 3 | Status: SHIPPED | OUTPATIENT
Start: 2025-01-05

## 2025-01-03 RX ORDER — LORATADINE 10 MG/1
10 TABLET ORAL DAILY
Qty: 90 TABLET | Refills: 1 | Status: SHIPPED | OUTPATIENT
Start: 2025-01-03 | End: 2025-07-02

## 2025-01-03 RX ORDER — DOXYCYCLINE 100 MG/1
100 CAPSULE ORAL 2 TIMES DAILY
Qty: 20 CAPSULE | Refills: 0 | Status: SHIPPED | OUTPATIENT
Start: 2025-01-03 | End: 2025-01-13

## 2025-01-03 RX ORDER — LORATADINE PSEUDOEPHEDRINE SULFATE 10; 240 MG/1; MG/1
1 TABLET, EXTENDED RELEASE ORAL EVERY 24 HOURS
Qty: 30 TABLET | Refills: 11 | Status: SHIPPED | OUTPATIENT
Start: 2025-01-03

## 2025-01-03 ASSESSMENT — PATIENT HEALTH QUESTIONNAIRE - PHQ9
1. LITTLE INTEREST OR PLEASURE IN DOING THINGS: NOT AT ALL
SUM OF ALL RESPONSES TO PHQ9 QUESTIONS 1 AND 2: 0
2. FEELING DOWN, DEPRESSED OR HOPELESS: NOT AT ALL

## 2025-01-18 NOTE — ASSESSMENT & PLAN NOTE
Pap and HPV are ordered per her request.  Mammogram is ordered.  Regular exercise and attaining/maintaining a healthy weight is encouraged.   Adequate calcium intake with diet or supplements is encouraged.    We will notify of any abnormal results.

## 2025-01-18 NOTE — PROGRESS NOTES
"Subjective   Patient ID: Claudia Gregory is a 44 y.o. female who presents for Annual Exam (Check right breast - clogged milk duct?).  She presents for annual exam.  Pap and HPV were negative 9/14/2022. LEEP was performed in distant past with negative pap since. She had a benign right breast biopsy with Dr. Rodríguez showing fibroadenoma on 12/7/2022.    She notes a \"clogged milk duct\" on right breast off and on for years. She is often able to express fluid from this. Recently she had inflammation and pain in this area. She took a course of antibiotics with improved symptoms, but this lump remains. She desires to have this area removed by surgeon. Referral is placed.    At last year's annual exam she was noting some hot flushes and irregular menses. TSH returned normal and FSH and estradiol did not show signs of menopause. We planned provera if no menses by cycle day 45. Since then she was found to be deficient in vitamin D. When supplementation began she had menses return to monthly.            Review of Systems   Constitutional:  Negative for activity change.   HENT:  Negative for congestion.    Respiratory:  Negative for apnea and cough.    Cardiovascular:  Negative for chest pain.   Gastrointestinal:  Negative for constipation and diarrhea.   Genitourinary:  Negative for hematuria and vaginal pain.   Musculoskeletal:  Negative for joint swelling.   Neurological:  Negative for dizziness.   Psychiatric/Behavioral:  Negative for agitation.        Past Medical History:   Diagnosis Date    Abnormal Pap smear of cervix 2011    Cervicalgia     Chronic neck pain    Dorsalgia, unspecified     Chronic back pain    DUB (dysfunctional uterine bleeding) 01/23/2024    Menses are spaced to about every two months. Will use provera if no spontaneous menses by cycle day 45. FSH and estradiol showed no menopause on 2/19/2024.      HPV (human papilloma virus) infection 2011    Hypermobility syndrome     Benign hypermobility syndrome    " Personal history of other diseases of the musculoskeletal system and connective tissue     History of fibromyalgia    Personal history of other diseases of the musculoskeletal system and connective tissue     History of scoliosis    Unspecified otitis externa, unspecified ear 11/18/2016    Otitis externa      Past Surgical History:   Procedure Laterality Date    ADENOIDECTOMY  03/14/2016    Adenoidectomy    BREAST BIOPSY Right 12/07/2022    RT benign fibroadenoma    OTHER SURGICAL HISTORY  07/21/2020    Cervical loop electrosurgical excision    OTHER SURGICAL HISTORY  12/07/2022    Nose surgery    TONSILLECTOMY  03/14/2016    Tonsillectomy      Allergies   Allergen Reactions    Adhesive Tape-Silicones Other    Bactrim [Sulfamethoxazole-Trimethoprim] Rash    Keflex [Cephalexin] Other      Current Outpatient Medications on File Prior to Visit   Medication Sig Dispense Refill    albuterol 90 mcg/actuation inhaler Inhale 2 puffs every 4 hours if needed for shortness of breath. 18 g 1    azelastine (Astelin) 137 mcg (0.1 %) nasal spray Administer 1 spray into each nostril 2 times a day. 30 mL 2    ergocalciferol (Vitamin D-2) 1.25 MG (42791 UT) capsule Take 1 capsule (50,000 Units) by mouth 1 (one) time per week. 13 capsule 3    fluticasone (Flonase Allergy Relief) 50 mcg/actuation nasal spray Administer 1 spray into each nostril once daily. 16 g 3    HYDROcodone-ibuprofen (Vicoprofen) 7.5-200 mg tablet TAKE 1 TABLET BY MOUTH AS NEEDED TWICE DAILY OK FILL AFTER 3/24/24      loratadine (Claritin) 10 mg tablet Take 1 tablet (10 mg) by mouth once daily. 90 tablet 1    loratadine-pseudoephedrine (Claritin-D 24 Hour)  mg 24 hr tablet Take 1 tablet by mouth once every 24 hours. 30 tablet 11    doxycycline (Vibramycin) 100 mg capsule Take 1 capsule (100 mg) by mouth 2 times a day. Take with at least 8 ounces (large glass) of water, do not lie down for 30 minutes after (Patient not taking: Reported on 1/21/2025) 20 capsule  0     No current facility-administered medications on file prior to visit.        Objective   Physical Exam  Constitutional:       Appearance: Normal appearance.   Neck:      Thyroid: No thyromegaly.   Cardiovascular:      Rate and Rhythm: Normal rate and regular rhythm.      Heart sounds: Normal heart sounds.   Pulmonary:      Effort: Pulmonary effort is normal.      Breath sounds: Normal breath sounds.   Chest:      Chest wall: No mass.   Breasts:     Right: Normal. No inverted nipple, mass, nipple discharge or skin change.      Left: Normal. No inverted nipple, mass, nipple discharge or skin change.          Comments: Small skin inclusion cyst is noted on right medial breast adjacent to areola.   Abdominal:      General: There is no distension.      Palpations: Abdomen is soft. There is no mass.      Tenderness: There is no abdominal tenderness.   Genitourinary:     General: Normal vulva.      Exam position: Lithotomy position.      Labia:         Right: No rash.         Left: No rash.       Urethra: No urethral lesion.      Vagina: Normal. No lesions.      Cervix: No friability or lesion.      Uterus: Normal. Not enlarged and not tender.       Adnexa: Right adnexa normal and left adnexa normal.        Right: No mass or tenderness.          Left: No mass or tenderness.     Musculoskeletal:         General: No deformity.      Cervical back: Neck supple.   Lymphadenopathy:      Cervical: No cervical adenopathy.   Skin:     General: Skin is warm and dry.      Findings: No rash.   Neurological:      General: No focal deficit present.      Mental Status: She is alert.   Psychiatric:         Mood and Affect: Mood normal.         Behavior: Behavior is cooperative.         Thought Content: Thought content normal.           Problem List Items Addressed This Visit       Well woman exam with routine gynecological exam - Primary    Overview     Pap and HPV were negative 9/14/2022. LEEP was performed in distant past with  negative pap since.   She had a benign right breast biopsy with Dr. Rodríguez showing fibroadenoma on 12/7/2022.         Current Assessment & Plan     Pap and HPV are ordered per her request.  Mammogram is ordered.  Regular exercise and attaining/maintaining a healthy weight is encouraged.   Adequate calcium intake with diet or supplements is encouraged.    We will notify of any abnormal results.          RESOLVED: DUB (dysfunctional uterine bleeding)    Overview     Menses are spaced to about every two months. Will use provera if no spontaneous menses by cycle day 45. FSH and estradiol showed no menopause on 2/19/2024.         Cyst of skin of right breast    Overview     Chronic skin cyst on right breast which gets inflamed at times. She desires excision.         Relevant Orders    Referral to General Surgery     Other Visit Diagnoses       Breast cancer screening by mammogram        Relevant Orders    BI mammo bilateral screening tomosynthesis

## 2025-01-21 ENCOUNTER — APPOINTMENT (OUTPATIENT)
Dept: OBSTETRICS AND GYNECOLOGY | Facility: CLINIC | Age: 45
End: 2025-01-21
Payer: COMMERCIAL

## 2025-01-21 VITALS
WEIGHT: 178 LBS | HEIGHT: 69 IN | SYSTOLIC BLOOD PRESSURE: 140 MMHG | DIASTOLIC BLOOD PRESSURE: 90 MMHG | BODY MASS INDEX: 26.36 KG/M2

## 2025-01-21 DIAGNOSIS — N93.8 DUB (DYSFUNCTIONAL UTERINE BLEEDING): ICD-10-CM

## 2025-01-21 DIAGNOSIS — Z12.31 BREAST CANCER SCREENING BY MAMMOGRAM: ICD-10-CM

## 2025-01-21 DIAGNOSIS — N60.81 CYST OF SKIN OF RIGHT BREAST: ICD-10-CM

## 2025-01-21 DIAGNOSIS — Z01.419 WELL WOMAN EXAM WITH ROUTINE GYNECOLOGICAL EXAM: Primary | ICD-10-CM

## 2025-01-21 PROCEDURE — 99396 PREV VISIT EST AGE 40-64: CPT | Performed by: OBSTETRICS & GYNECOLOGY

## 2025-01-21 PROCEDURE — 3008F BODY MASS INDEX DOCD: CPT | Performed by: OBSTETRICS & GYNECOLOGY

## 2025-01-21 PROCEDURE — 87624 HPV HI-RISK TYP POOLED RSLT: CPT

## 2025-01-21 ASSESSMENT — ENCOUNTER SYMPTOMS
COUGH: 0
APNEA: 0
DIARRHEA: 0
CONSTIPATION: 0
HEMATURIA: 0
ACTIVITY CHANGE: 0
JOINT SWELLING: 0
DIZZINESS: 0
AGITATION: 0

## 2025-01-28 ENCOUNTER — APPOINTMENT (OUTPATIENT)
Dept: OBSTETRICS AND GYNECOLOGY | Facility: CLINIC | Age: 45
End: 2025-01-28
Payer: COMMERCIAL

## 2025-02-08 ENCOUNTER — OFFICE VISIT (OUTPATIENT)
Dept: URGENT CARE | Age: 45
End: 2025-02-08
Payer: COMMERCIAL

## 2025-02-08 VITALS — HEART RATE: 103 BPM | OXYGEN SATURATION: 98 % | TEMPERATURE: 98.3 F | RESPIRATION RATE: 16 BRPM

## 2025-02-08 DIAGNOSIS — Z20.822 SUSPECTED 2019 NOVEL CORONAVIRUS INFECTION: ICD-10-CM

## 2025-02-08 DIAGNOSIS — J10.1 INFLUENZA A: Primary | ICD-10-CM

## 2025-02-08 LAB
POC RAPID INFLUENZA A: POSITIVE
POC RAPID INFLUENZA B: NEGATIVE
POC SARS-COV-2 AG BINAX: NORMAL

## 2025-02-08 RX ORDER — OSELTAMIVIR PHOSPHATE 75 MG/1
75 CAPSULE ORAL EVERY 12 HOURS
Qty: 10 CAPSULE | Refills: 0 | Status: SHIPPED | OUTPATIENT
Start: 2025-02-08 | End: 2025-02-13

## 2025-02-08 NOTE — PROGRESS NOTES
Subjective   History of Present Illness: Claudia Gregory is a 44 y.o. female. They present today with a chief complaint of Headache, Generalized Body Aches, and Nasal Congestion (Works in school setting).          Past Medical History  Allergies as of 02/08/2025 - Reviewed 02/08/2025   Allergen Reaction Noted    Adhesive tape-silicones Other 04/07/2023    Bactrim [sulfamethoxazole-trimethoprim] Rash 06/15/2023    Keflex [cephalexin] Other 04/07/2023       (Not in a hospital admission)       Past Medical History:   Diagnosis Date    Abnormal Pap smear of cervix 2011    Cervicalgia     Chronic neck pain    Dorsalgia, unspecified     Chronic back pain    DUB (dysfunctional uterine bleeding) 01/23/2024    Menses are spaced to about every two months. Will use provera if no spontaneous menses by cycle day 45. FSH and estradiol showed no menopause on 2/19/2024.      HPV (human papilloma virus) infection 2011    Hypermobility syndrome     Benign hypermobility syndrome    Personal history of other diseases of the musculoskeletal system and connective tissue     History of fibromyalgia    Personal history of other diseases of the musculoskeletal system and connective tissue     History of scoliosis    Unspecified otitis externa, unspecified ear 11/18/2016    Otitis externa       Past Surgical History:   Procedure Laterality Date    ADENOIDECTOMY  03/14/2016    Adenoidectomy    BREAST BIOPSY Right 12/07/2022    RT benign fibroadenoma    OTHER SURGICAL HISTORY  07/21/2020    Cervical loop electrosurgical excision    OTHER SURGICAL HISTORY  12/07/2022    Nose surgery    TONSILLECTOMY  03/14/2016    Tonsillectomy        reports that she has been smoking cigarettes. She has a 15 pack-year smoking history. She has never used smokeless tobacco. She reports that she does not drink alcohol and does not use drugs.    Review of Systems  Review of Systems                               Objective    Vitals:    02/08/25 1129   Pulse: 103    Resp: 16   Temp: 36.8 °C (98.3 °F)   SpO2: 98%     Patient's last menstrual period was 01/04/2025 (exact date).    Physical Exam  Vitals reviewed.   Constitutional:       Appearance: She is ill-appearing.   HENT:      Head: Normocephalic and atraumatic.      Right Ear: Tympanic membrane and ear canal normal. Tenderness present.      Left Ear: Tympanic membrane and ear canal normal. No tenderness.      Nose: Congestion present.      Mouth/Throat:      Mouth: Mucous membranes are moist.      Pharynx: Oropharynx is clear. Uvula midline. No posterior oropharyngeal erythema.   Eyes:      Extraocular Movements: Extraocular movements intact.      Conjunctiva/sclera: Conjunctivae normal.      Pupils: Pupils are equal, round, and reactive to light.   Cardiovascular:      Rate and Rhythm: Normal rate and regular rhythm.      Heart sounds: No murmur heard.  Pulmonary:      Effort: Pulmonary effort is normal.      Breath sounds: Normal breath sounds. No decreased breath sounds, wheezing, rhonchi or rales.   Skin:     General: Skin is warm.   Neurological:      Mental Status: She is alert and oriented to person, place, and time.   Psychiatric:         Mood and Affect: Mood normal.         Behavior: Behavior normal.             Point of Care Test & Imaging Results from this visit  Results for orders placed or performed in visit on 02/08/25   POCT Covid-19 Rapid Antigen   Result Value Ref Range    POC JOYCELYN-COV-2 AG  Presumptive negative test for SARS-CoV-2 (no antigen detected)     Presumptive negative test for SARS-CoV-2 (no antigen detected)   POCT Influenza A/B manually resulted   Result Value Ref Range    POC Rapid Influenza A Positive (A) Negative    POC Rapid Influenza B Negative Negative      No results found.    Diagnostic study results (if any) were reviewed by Viktoriya Segundo PA-C.    Assessment/Plan   Allergies, medications, history, and pertinent labs/EKGs/Imaging reviewed by Viktoriya Segundo PA-C.     Medical Decision  Making  Patient presents for flulike symptoms since last night.  She took Mucinex to alleviate her symptoms.  She denies chest pain, shortness of breath, palpitation, visual and auditory changes, abdominal pain, changes in bowel and urinary habits.    Patient is positive for flu A.  Negative for flu B and COVID.  I will start patient on Tamiflu.  I advised patient to start on DayQuil and NyQuil as needed for her symptoms.    -         Patient is educated about their diagnoses.     -          Discussed medications benefits and adverse effects.     -          Answered all patient’s questions.     -          Patient will call 911 or go to the nearest ED if worsen symptoms .     -          Patient is agreeable to the plan of care and is deemed stable upon discharge.     -          Follow up with your primary care provider in two days.    Orders and Diagnoses  Diagnoses and all orders for this visit:  Influenza A  -     oseltamivir (Tamiflu) 75 mg capsule; Take 1 capsule (75 mg) by mouth every 12 hours for 5 days.  Suspected 2019 novel coronavirus infection  -     POCT Covid-19 Rapid Antigen  -     POCT Influenza A/B manually resulted      Medical Admin Record      Patient disposition: Home    Electronically signed by Viktoriya Segundo PA-C  11:55 AM

## 2025-02-12 LAB
25(OH)D3+25(OH)D2 SERPL-MCNC: 61 NG/ML (ref 30–100)
ALBUMIN SERPL-MCNC: 4.6 G/DL (ref 3.6–5.1)
ALP SERPL-CCNC: 88 U/L (ref 31–125)
ALT SERPL-CCNC: 28 U/L (ref 6–29)
AST SERPL-CCNC: 24 U/L (ref 10–30)
BASOPHILS # BLD AUTO: 47 CELLS/UL (ref 0–200)
BASOPHILS NFR BLD AUTO: 0.7 %
BILIRUB SERPL-MCNC: 0.3 MG/DL (ref 0.2–1.2)
BUN SERPL-MCNC: 12 MG/DL (ref 7–25)
CALCIUM SERPL-MCNC: 9.2 MG/DL (ref 8.6–10.2)
CHLORIDE SERPL-SCNC: 107 MMOL/L (ref 98–110)
CHOLEST SERPL-MCNC: 195 MG/DL
CHOLEST/HDLC SERPL: 3.8 (CALC)
CO2 SERPL-SCNC: ABNORMAL MMOL/L
CREAT SERPL-MCNC: 0.44 MG/DL (ref 0.5–0.99)
EGFRCR SERPLBLD CKD-EPI 2021: 123 ML/MIN/1.73M2
EOSINOPHIL # BLD AUTO: 188 CELLS/UL (ref 15–500)
EOSINOPHIL NFR BLD AUTO: 2.8 %
ERYTHROCYTE [DISTWIDTH] IN BLOOD BY AUTOMATED COUNT: 12 % (ref 11–15)
EST. AVERAGE GLUCOSE BLD GHB EST-MCNC: 120 MG/DL
EST. AVERAGE GLUCOSE BLD GHB EST-SCNC: 6.6 MMOL/L
GLUCOSE SERPL-MCNC: 96 MG/DL (ref 65–99)
HBA1C MFR BLD: 5.8 % OF TOTAL HGB
HCT VFR BLD AUTO: 43.8 % (ref 35–45)
HDLC SERPL-MCNC: 52 MG/DL
HGB BLD-MCNC: 15.2 G/DL (ref 11.7–15.5)
LDLC SERPL CALC-MCNC: 117 MG/DL (CALC)
LYMPHOCYTES # BLD AUTO: 2312 CELLS/UL (ref 850–3900)
LYMPHOCYTES NFR BLD AUTO: 34.5 %
MCH RBC QN AUTO: 31.5 PG (ref 27–33)
MCHC RBC AUTO-ENTMCNC: 34.7 G/DL (ref 32–36)
MCV RBC AUTO: 90.9 FL (ref 80–100)
MONOCYTES # BLD AUTO: 529 CELLS/UL (ref 200–950)
MONOCYTES NFR BLD AUTO: 7.9 %
NEUTROPHILS # BLD AUTO: 3625 CELLS/UL (ref 1500–7800)
NEUTROPHILS NFR BLD AUTO: 54.1 %
NONHDLC SERPL-MCNC: 143 MG/DL (CALC)
PLATELET # BLD AUTO: NORMAL THOUSAND/UL
POTASSIUM SERPL-SCNC: 4.2 MMOL/L (ref 3.5–5.3)
PROT SERPL-MCNC: 7.5 G/DL (ref 6.1–8.1)
RBC # BLD AUTO: 4.82 MILLION/UL (ref 3.8–5.1)
SODIUM SERPL-SCNC: 143 MMOL/L (ref 135–146)
TRIGL SERPL-MCNC: 146 MG/DL
TSH SERPL-ACNC: 0.71 MIU/L
WBC # BLD AUTO: 6.7 THOUSAND/UL (ref 3.8–10.8)

## 2025-02-13 NOTE — RESULT ENCOUNTER NOTE
Cholesterol looks okay at 195, HDL 52, triglycerides 146,     Sugar, kidneys, liver, electrolytes are all within normal limits    Hemoglobin A1c shows prediabetes at 5.8%    Complete blood cell count shows no anemia    Thyroid within normal limits alongside a normal vitamin D

## 2025-02-14 ENCOUNTER — OFFICE VISIT (OUTPATIENT)
Dept: URGENT CARE | Age: 45
End: 2025-02-14
Payer: COMMERCIAL

## 2025-02-14 VITALS
HEART RATE: 112 BPM | TEMPERATURE: 98.6 F | OXYGEN SATURATION: 95 % | SYSTOLIC BLOOD PRESSURE: 133 MMHG | DIASTOLIC BLOOD PRESSURE: 90 MMHG

## 2025-02-14 DIAGNOSIS — R06.2 WHEEZING: Primary | ICD-10-CM

## 2025-02-14 DIAGNOSIS — J22 LOWER RESPIRATORY TRACT INFECTION: ICD-10-CM

## 2025-02-14 RX ORDER — DOXYCYCLINE 100 MG/1
100 CAPSULE ORAL 2 TIMES DAILY
Qty: 14 CAPSULE | Refills: 0 | Status: SHIPPED | OUTPATIENT
Start: 2025-02-14 | End: 2025-02-21

## 2025-02-14 RX ORDER — METHYLPREDNISOLONE 4 MG/1
TABLET ORAL
Qty: 21 TABLET | Refills: 0 | Status: SHIPPED | OUTPATIENT
Start: 2025-02-14 | End: 2025-02-20

## 2025-02-14 NOTE — PROGRESS NOTES
Subjective   Patient ID: Claudia Gregory is a 44 y.o. female. They present today with a chief complaint of Illness (Was here 5 days ago. Had positive flu A. Not better.).      Past Medical History  Allergies as of 02/14/2025 - Reviewed 02/08/2025   Allergen Reaction Noted    Adhesive tape-silicones Other 04/07/2023    Bactrim [sulfamethoxazole-trimethoprim] Rash 06/15/2023    Keflex [cephalexin] Other 04/07/2023       (Not in a hospital admission)       Past Medical History:   Diagnosis Date    Abnormal Pap smear of cervix 2011    Cervicalgia     Chronic neck pain    Dorsalgia, unspecified     Chronic back pain    DUB (dysfunctional uterine bleeding) 01/23/2024    Menses are spaced to about every two months. Will use provera if no spontaneous menses by cycle day 45. FSH and estradiol showed no menopause on 2/19/2024.      HPV (human papilloma virus) infection 2011    Hypermobility syndrome     Benign hypermobility syndrome    Personal history of other diseases of the musculoskeletal system and connective tissue     History of fibromyalgia    Personal history of other diseases of the musculoskeletal system and connective tissue     History of scoliosis    Unspecified otitis externa, unspecified ear 11/18/2016    Otitis externa       Past Surgical History:   Procedure Laterality Date    ADENOIDECTOMY  03/14/2016    Adenoidectomy    BREAST BIOPSY Right 12/07/2022    RT benign fibroadenoma    OTHER SURGICAL HISTORY  07/21/2020    Cervical loop electrosurgical excision    OTHER SURGICAL HISTORY  12/07/2022    Nose surgery    TONSILLECTOMY  03/14/2016    Tonsillectomy        reports that she has been smoking cigarettes. She has a 15 pack-year smoking history. She has never used smokeless tobacco. She reports that she does not drink alcohol and does not use drugs.    Review of Systems  Review of Systems                               Objective    Vitals:    02/14/25 1106   BP: 133/90   Pulse: (!) 112   Temp: 37 °C (98.6 °F)    SpO2: 95%     Patient's last menstrual period was 01/04/2025 (exact date).    Physical Exam  Vitals reviewed.   HENT:      Head: Normocephalic and atraumatic.      Right Ear: Tympanic membrane and ear canal normal. No tenderness.      Left Ear: Tympanic membrane and ear canal normal. No tenderness.      Nose: Congestion present.      Mouth/Throat:      Mouth: Mucous membranes are moist.      Pharynx: Oropharynx is clear. Uvula midline. No pharyngeal swelling or posterior oropharyngeal erythema.   Eyes:      Extraocular Movements: Extraocular movements intact.      Conjunctiva/sclera: Conjunctivae normal.      Pupils: Pupils are equal, round, and reactive to light.   Cardiovascular:      Rate and Rhythm: Normal rate and regular rhythm.      Heart sounds: No murmur heard.  Pulmonary:      Effort: Pulmonary effort is normal.      Breath sounds: Wheezing present.   Skin:     General: Skin is warm.   Neurological:      Mental Status: She is alert and oriented to person, place, and time.   Psychiatric:         Mood and Affect: Mood normal.         Behavior: Behavior normal.             Point of Care Test & Imaging Results from this visit  No results found for this visit on 02/14/25.   No results found.    Diagnostic study results (if any) were reviewed by Viktoriya Segundo PA-C.    Assessment/Plan   Allergies, medications, history, and pertinent labs/EKGs/Imaging reviewed by Viktoriya Segundo PA-C.     Medical Decision Making  -Patient was positive for flu a last week.  She finished the course of Tamiflu.  She states that her URI symptoms has improved.  Now, she has wheezing, increased in coughing, chest discomfort.  Patient is a smoker but she has not been smoking since she got the flu.  I will start patient on a Medrol Dosepak and doxycycline.  -         Patient is educated about their diagnoses.     -          Discussed medications benefits and adverse effects.     -          Answered all patient’s questions.     -           Patient will call 911 or go to the nearest ED if worsen symptoms .     -          Patient is agreeable to the plan of care and is deemed stable upon discharge.     -          Follow up with your primary care provider in two days.    Orders and Diagnoses  Diagnoses and all orders for this visit:  Wheezing  -     methylPREDNISolone (Medrol Dospak) 4 mg tablets; Follow schedule on package instructions  Lower respiratory tract infection  -     doxycycline (Vibramycin) 100 mg capsule; Take 1 capsule (100 mg) by mouth 2 times a day for 7 days.      Medical Admin Record      Patient disposition: Home    Electronically signed by Viktoriya Segundo PA-C  11:33 AM

## 2025-02-24 ENCOUNTER — APPOINTMENT (OUTPATIENT)
Dept: RADIOLOGY | Facility: HOSPITAL | Age: 45
End: 2025-02-24
Payer: COMMERCIAL

## 2025-02-26 ENCOUNTER — HOSPITAL ENCOUNTER (OUTPATIENT)
Dept: RADIOLOGY | Facility: HOSPITAL | Age: 45
Discharge: HOME | End: 2025-02-26
Payer: COMMERCIAL

## 2025-02-26 ENCOUNTER — APPOINTMENT (OUTPATIENT)
Dept: SURGERY | Facility: CLINIC | Age: 45
End: 2025-02-26
Payer: COMMERCIAL

## 2025-02-26 ENCOUNTER — APPOINTMENT (OUTPATIENT)
Dept: RADIOLOGY | Facility: HOSPITAL | Age: 45
End: 2025-02-26
Payer: COMMERCIAL

## 2025-02-26 VITALS
SYSTOLIC BLOOD PRESSURE: 112 MMHG | DIASTOLIC BLOOD PRESSURE: 78 MMHG | HEIGHT: 69 IN | WEIGHT: 178.6 LBS | OXYGEN SATURATION: 96 % | BODY MASS INDEX: 26.45 KG/M2 | HEART RATE: 97 BPM

## 2025-02-26 VITALS — WEIGHT: 178 LBS | BODY MASS INDEX: 26.36 KG/M2 | HEIGHT: 69 IN

## 2025-02-26 DIAGNOSIS — Z12.31 BREAST CANCER SCREENING BY MAMMOGRAM: ICD-10-CM

## 2025-02-26 DIAGNOSIS — N60.81 CYST OF SKIN OF RIGHT BREAST: Primary | ICD-10-CM

## 2025-02-26 PROCEDURE — 3008F BODY MASS INDEX DOCD: CPT | Performed by: SURGERY

## 2025-02-26 PROCEDURE — 77067 SCR MAMMO BI INCL CAD: CPT | Performed by: RADIOLOGY

## 2025-02-26 PROCEDURE — 77067 SCR MAMMO BI INCL CAD: CPT

## 2025-02-26 PROCEDURE — 99214 OFFICE O/P EST MOD 30 MIN: CPT | Performed by: SURGERY

## 2025-02-26 PROCEDURE — 77063 BREAST TOMOSYNTHESIS BI: CPT | Performed by: RADIOLOGY

## 2025-02-26 ASSESSMENT — ENCOUNTER SYMPTOMS
ABDOMINAL PAIN: 0
BLOOD IN STOOL: 0
FEVER: 0
DIZZINESS: 0
SHORTNESS OF BREATH: 0
CHILLS: 0
COUGH: 0
VOMITING: 0
NAUSEA: 0
CONSTIPATION: 0
HEADACHES: 0
DIARRHEA: 0
PALPITATIONS: 0

## 2025-02-26 NOTE — PROGRESS NOTES
GENERAL SURGERY CLINIC NOTE    Claudia Gregory   1980   [id number]     History Of Present Illness  Claudia Gregory is a 44 y.o. female who presents to the office for evaluation of a right breast concern. For the last 20 years, she would experience intermittent swelling/infection of a specific location just medial to her right nipple. She is generally able to express some fluid. More recently, there was significant swelling and she required antibiotics. That has since resolved. She is not certain of the frequency, but states it occurs probably a few times a year.      Past Medical History  She has a past medical history of Abnormal Pap smear of cervix (2011), BRCA1 negative (?), BRCA2 negative (?), Breast cyst (2012), Cervicalgia, Dorsalgia, unspecified, DUB (dysfunctional uterine bleeding) (01/23/2024), Fibrocystic breast (2022), HPV (human papilloma virus) infection (2011), Hypermobility syndrome, Personal history of other diseases of the musculoskeletal system and connective tissue, Personal history of other diseases of the musculoskeletal system and connective tissue, and Unspecified otitis externa, unspecified ear (11/18/2016).    She has no past medical history of Personal history of irradiation.    Surgical History  She has a past surgical history that includes Adenoidectomy (03/14/2016); Tonsillectomy (03/14/2016); Other surgical history (07/21/2020); Other surgical history (12/07/2022); and Breast biopsy (Right, 12/07/2022).    Medications  Current Outpatient Medications on File Prior to Visit   Medication Sig Dispense Refill    albuterol 90 mcg/actuation inhaler Inhale 2 puffs every 4 hours if needed for shortness of breath. 18 g 1    azelastine (Astelin) 137 mcg (0.1 %) nasal spray Administer 1 spray into each nostril 2 times a day. 30 mL 2    ergocalciferol (Vitamin D-2) 1.25 MG (50417 UT) capsule Take 1 capsule (50,000 Units) by mouth 1 (one) time per week. 13 capsule 3    fluticasone (Flonase Allergy  Relief) 50 mcg/actuation nasal spray Administer 1 spray into each nostril once daily. 16 g 3    HYDROcodone-ibuprofen (Vicoprofen) 7.5-200 mg tablet TAKE 1 TABLET BY MOUTH AS NEEDED TWICE DAILY OK FILL AFTER 3/24/24      loratadine (Claritin) 10 mg tablet Take 1 tablet (10 mg) by mouth once daily. 90 tablet 1    loratadine-pseudoephedrine (Claritin-D 24 Hour)  mg 24 hr tablet Take 1 tablet by mouth once every 24 hours. 30 tablet 11    doxycycline (Vibramycin) 100 mg capsule Take 1 capsule (100 mg) by mouth 2 times a day. Take with at least 8 ounces (large glass) of water, do not lie down for 30 minutes after (Patient not taking: Reported on 2025) 20 capsule 0    [] doxycycline (Vibramycin) 100 mg capsule Take 1 capsule (100 mg) by mouth 2 times a day for 7 days. 14 capsule 0    [] methylPREDNISolone (Medrol Dospak) 4 mg tablets Follow schedule on package instructions 21 tablet 0     No current facility-administered medications on file prior to visit.       Allergies  Adhesive tape-silicones, Bactrim [sulfamethoxazole-trimethoprim], and Keflex [cephalexin]     Social History  She reports that she has been smoking cigarettes. She has a 15 pack-year smoking history. She has never used smokeless tobacco. She reports that she does not drink alcohol and does not use drugs.    Family History  Family History   Problem Relation Name Age of Onset    Other (bile duct cancer) Mother      Breast cancer Neg Hx     Mother diagnosed with bile duct cancer at 63  Maternal grandmother had colon cancer in her 80s     Review of Systems   Constitutional:  Negative for chills and fever.   Respiratory:  Negative for cough and shortness of breath.    Cardiovascular:  Negative for chest pain and palpitations.   Gastrointestinal:  Negative for abdominal pain, blood in stool, constipation, diarrhea, nausea and vomiting.   Neurological:  Negative for dizziness and headaches.   All other systems reviewed and are  "negative.      Last Recorded Vitals  Blood pressure 112/78, pulse 97, height 1.753 m (5' 9\"), weight 81 kg (178 lb 9.6 oz), SpO2 96%, not currently breastfeeding.     Physical Exam  Constitutional:       General: She is not in acute distress.     Appearance: Normal appearance. She is not ill-appearing.   HENT:      Head: Normocephalic and atraumatic.   Cardiovascular:      Rate and Rhythm: Normal rate and regular rhythm.   Pulmonary:      Effort: Pulmonary effort is normal. No respiratory distress.      Breath sounds: Normal breath sounds.   Chest:   Breasts:     Right: Skin change present. No mass, nipple discharge or tenderness.      Left: Normal. No mass, nipple discharge, skin change or tenderness.          Comments: In the area marked - there is a small area of ecchymosis. No palpable mass. No evidence of infection  Musculoskeletal:         General: No swelling.   Lymphadenopathy:      Upper Body:      Right upper body: No supraclavicular, axillary or pectoral adenopathy.      Left upper body: No supraclavicular, axillary or pectoral adenopathy.   Skin:     General: Skin is warm and dry.   Neurological:      Mental Status: She is alert and oriented to person, place, and time. Mental status is at baseline.   Psychiatric:         Mood and Affect: Mood normal.         Behavior: Behavior normal.          Relevant Results    No results found for this or any previous visit (from the past 24 hours).    No results found.    Assessment and Plan  44 y.o. female with a recurrent infection of what is probably a duct in her right breast that has healed from a recent infection. I recommend considering referral to a breast specialist to discuss additional testing and considering surgery to remove the involved duct. The patient expressed her understanding and stated she will probably hold off on seeing a specialist given the infrequency of these episodes. Follow up with me on an as needed basis. She expressed her understanding " and all questions were answered.     Breanna Fuller MD, FACS  General Surgery

## 2025-02-28 DIAGNOSIS — N60.81 CYST OF SKIN OF RIGHT BREAST: Primary | ICD-10-CM

## 2025-03-31 ENCOUNTER — TELEMEDICINE (OUTPATIENT)
Dept: PRIMARY CARE | Facility: CLINIC | Age: 45
End: 2025-03-31
Payer: COMMERCIAL

## 2025-03-31 DIAGNOSIS — T36.95XA ANTIBIOTIC-INDUCED YEAST INFECTION: ICD-10-CM

## 2025-03-31 DIAGNOSIS — B37.9 ANTIBIOTIC-INDUCED YEAST INFECTION: ICD-10-CM

## 2025-03-31 DIAGNOSIS — J31.0 CHRONIC RHINITIS: ICD-10-CM

## 2025-03-31 DIAGNOSIS — J06.9 ACUTE URI: Primary | ICD-10-CM

## 2025-03-31 DIAGNOSIS — J30.2 SEASONAL ALLERGIES: ICD-10-CM

## 2025-03-31 PROCEDURE — 99214 OFFICE O/P EST MOD 30 MIN: CPT | Performed by: STUDENT IN AN ORGANIZED HEALTH CARE EDUCATION/TRAINING PROGRAM

## 2025-03-31 RX ORDER — AMOXICILLIN AND CLAVULANATE POTASSIUM 875; 125 MG/1; MG/1
875 TABLET, FILM COATED ORAL 2 TIMES DAILY
Qty: 20 TABLET | Refills: 0 | Status: SHIPPED | OUTPATIENT
Start: 2025-03-31 | End: 2025-04-10

## 2025-03-31 RX ORDER — FLUCONAZOLE 150 MG/1
150 TABLET ORAL ONCE
Qty: 1 TABLET | Refills: 0 | Status: SHIPPED | OUTPATIENT
Start: 2025-03-31 | End: 2025-03-31

## 2025-03-31 RX ORDER — LORATADINE 10 MG/1
10 TABLET ORAL DAILY
Qty: 90 TABLET | Refills: 3 | Status: SHIPPED | OUTPATIENT
Start: 2025-03-31

## 2025-03-31 NOTE — PROGRESS NOTES
Subjective   Patient ID: Claudia Gregory is a 44 y.o. female who presents for Unwell.    HPI   Claudia is calling into the office today to discuss signs/symptoms of continued issues with her throat    Ultimately she was seen by an urgent care provider and was diagnosed with influenza and ultimately later on diagnosed with a possible/probable pneumonia    She was given doxycycline twice daily for only 7 days and she feels that this did not fully treat all of her signs/symptoms    Has been now 1 month later and she continues to have a slight irritation/discoloration of her throat and does not feel 100%  Even mention that she continues to have congestion as well as a loss of voice    Now asking be further evaluate/treated as she was told in the past by an ENT that she should be usually treated with a longer course of antibiotics if needed    Asking for advice/recommendations  Review of Systems  All pertinent positive symptoms are included in the history of present illness.    All other systems have been reviewed and are negative and noncontributory to this patient's current ailments.    Objective   There were no vitals taken for this visit.    Physical Exam  CONSTITUTIONAL - well nourished, well developed, looks like stated age, in no acute distress, not ill-appearing, and not tired appearing  SKIN - normal skin color and pigmentation, normal skin turgor without rash, lesions, or nodules visualized  HEAD - no trauma, normocephalic  EYES - normal external exam  CHEST -no distressed breathing, good effort  EXTREMITIES - no edema, no deformities  NEUROLOGICAL - normal balance, normal motor, no ataxia  PSYCHIATRIC - alert, pleasant and cordial, age-appropriate    Assessment/Plan   We had a long conversation about your signs/symptoms I did provide Augmentin to be taken twice daily for the next 7-10 days  Risks, benefits, and options of treatment(s) were discussed after reviewing all current medication(s) and drug allergy(ies)  I  opted for the treatment that we discussed with instructions on the medication use for your underlying medical ailment(s)  I encouraged supportive care such as rest, fluids and Advil/Tylenol as warranted  Return to the clinic in 7-10 days or sooner if symptoms worsen or persist as we will then further evaluate    Lastly, per your request, I did provide Diflucan x 1 due to your history of antibiotic induced yeast infections

## 2025-04-15 DIAGNOSIS — B37.9 YEAST INFECTION: ICD-10-CM

## 2025-04-15 RX ORDER — FLUCONAZOLE 150 MG/1
150 TABLET ORAL ONCE
Qty: 1 TABLET | Refills: 0 | Status: SHIPPED | OUTPATIENT
Start: 2025-04-15 | End: 2025-04-15

## 2025-04-22 ENCOUNTER — APPOINTMENT (OUTPATIENT)
Dept: OPHTHALMOLOGY | Age: 45
End: 2025-04-22
Payer: COMMERCIAL

## 2025-04-24 ENCOUNTER — APPOINTMENT (OUTPATIENT)
Dept: OPHTHALMOLOGY | Age: 45
End: 2025-04-24
Payer: COMMERCIAL

## 2025-05-20 ENCOUNTER — APPOINTMENT (OUTPATIENT)
Dept: OPHTHALMOLOGY | Facility: CLINIC | Age: 45
End: 2025-05-20
Payer: COMMERCIAL

## 2025-05-23 DIAGNOSIS — N60.81 CYST OF SKIN OF RIGHT BREAST: Primary | ICD-10-CM

## 2025-06-09 DIAGNOSIS — N60.81 CYST OF SKIN OF RIGHT BREAST: Primary | ICD-10-CM

## 2025-06-16 ENCOUNTER — HOSPITAL ENCOUNTER (OUTPATIENT)
Dept: RADIOLOGY | Facility: CLINIC | Age: 45
Discharge: HOME | End: 2025-06-16
Payer: COMMERCIAL

## 2025-06-16 DIAGNOSIS — N60.81 CYST OF SKIN OF RIGHT BREAST: ICD-10-CM

## 2025-06-16 PROCEDURE — 76982 USE 1ST TARGET LESION: CPT

## 2025-06-16 PROCEDURE — 76642 ULTRASOUND BREAST LIMITED: CPT | Mod: RT

## 2025-06-16 PROCEDURE — 76642 ULTRASOUND BREAST LIMITED: CPT | Mod: RIGHT SIDE | Performed by: STUDENT IN AN ORGANIZED HEALTH CARE EDUCATION/TRAINING PROGRAM

## 2025-06-18 ENCOUNTER — APPOINTMENT (OUTPATIENT)
Dept: SURGICAL ONCOLOGY | Facility: CLINIC | Age: 45
End: 2025-06-18
Payer: COMMERCIAL

## 2025-06-23 ENCOUNTER — APPOINTMENT (OUTPATIENT)
Dept: SURGICAL ONCOLOGY | Facility: CLINIC | Age: 45
End: 2025-06-23
Payer: COMMERCIAL

## 2025-06-30 DIAGNOSIS — M71.38 SYNOVIAL CYST OF LUMBAR SPINE: ICD-10-CM

## 2025-07-11 DIAGNOSIS — L98.9 SKIN LESION: Primary | ICD-10-CM

## 2025-07-31 ENCOUNTER — APPOINTMENT (OUTPATIENT)
Dept: OPHTHALMOLOGY | Age: 45
End: 2025-07-31
Payer: COMMERCIAL

## 2026-02-10 ENCOUNTER — APPOINTMENT (OUTPATIENT)
Dept: OBSTETRICS AND GYNECOLOGY | Facility: CLINIC | Age: 46
End: 2026-02-10
Payer: COMMERCIAL